# Patient Record
Sex: FEMALE | Race: WHITE | Employment: PART TIME | URBAN - NONMETROPOLITAN AREA
[De-identification: names, ages, dates, MRNs, and addresses within clinical notes are randomized per-mention and may not be internally consistent; named-entity substitution may affect disease eponyms.]

---

## 2022-12-30 ENCOUNTER — HOSPITAL ENCOUNTER (OUTPATIENT)
Dept: NURSING | Age: 27
Discharge: HOME | End: 2022-12-30

## 2023-01-10 ENCOUNTER — HOSPITAL ENCOUNTER (OUTPATIENT)
Dept: NURSING | Age: 28
Discharge: HOME | End: 2023-01-10
Payer: COMMERCIAL

## 2023-01-10 VITALS
HEART RATE: 101 BPM | RESPIRATION RATE: 18 BRPM | SYSTOLIC BLOOD PRESSURE: 136 MMHG | OXYGEN SATURATION: 98 % | DIASTOLIC BLOOD PRESSURE: 81 MMHG

## 2023-01-10 LAB
ANTIBODY SCREEN: NORMAL
GESTATIONAL AGE(WEEKS): NORMAL

## 2023-01-10 PROCEDURE — 6360000002 HC RX W HCPCS: Performed by: OBSTETRICS & GYNECOLOGY

## 2023-01-10 PROCEDURE — 96372 THER/PROPH/DIAG INJ SC/IM: CPT

## 2023-01-10 PROCEDURE — 86850 RBC ANTIBODY SCREEN: CPT

## 2023-01-10 RX ADMIN — HUMAN RHO(D) IMMUNE GLOBULIN 300 MCG: 300 INJECTION, SOLUTION INTRAMUSCULAR at 09:13

## 2023-01-10 NOTE — PROGRESS NOTES
5732 pt arrives ambulatory for rhogam injection. Injection explained and questions answered. PT RIGHTS AND RESPONSIBILITIES OFFERED TO PT.   [id number] labs drawn and sent down as ordered. 0913 injection given. Pt tolerated it well with no complaints. Pt discharged ambulatory with instructions with no complaints.                        _m___ Safety:       (Environmental)  [place] to environment  Ensure ID band is correct and in place/ allergy band as needed  Assess for fall risk  Initiate fall precautions as applicable (fall band, side rails, etc.)  Call light within reach  Bed in low position/ wheels locked    _m___ Pain:       Assess pain level and characteristics  Administer analgesics as ordered  Assess effectiveness of pain management and report to MD as needed    __m__ Knowledge Deficit:  Assess baseline knowledge  Provide teaching at level of understanding  Provide teaching via preferred learning method  Evaluate teaching effectiveness    __m__ Hemodynamic/Respiratory Status:       (Pre and Post Procedure Monitoring)  Assess/Monitor vital signs and LOC  Assess Baseline SpO2 prior to any sedation  Obtain weight/height  Assess vital signs/ LOC until patient meets discharge criteria  Monitor procedure site and notify MD of any issues

## 2023-01-10 NOTE — DISCHARGE INSTRUCTIONS
ACTIVITY:  Continue usual care with your doctor. Call your doctor immediately if any severe problems or go to the nearest emergency room. I have been treated and hereby acknowledge receiving this instruction sheet. PLEASE GIVE WHITE CARD TO NURSE WHEN YOU GO TO DELIVER.

## 2023-03-17 ENCOUNTER — APPOINTMENT (OUTPATIENT)
Dept: LABOR AND DELIVERY | Age: 28
End: 2023-03-17
Payer: COMMERCIAL

## 2023-03-17 ENCOUNTER — APPOINTMENT (OUTPATIENT)
Dept: ULTRASOUND IMAGING | Age: 28
End: 2023-03-17
Payer: COMMERCIAL

## 2023-03-17 ENCOUNTER — HOSPITAL ENCOUNTER (INPATIENT)
Age: 28
LOS: 3 days | Discharge: HOME | End: 2023-03-20
Attending: OBSTETRICS & GYNECOLOGY | Admitting: OBSTETRICS & GYNECOLOGY
Payer: COMMERCIAL

## 2023-03-17 PROBLEM — Z34.90 ENCOUNTER FOR ELECTIVE INDUCTION OF LABOR: Status: ACTIVE | Noted: 2023-03-17

## 2023-03-17 LAB
ABO: NORMAL
AMPHETAMINES UR QL SCN: NEGATIVE
ANTIBODY SCREEN: NORMAL
BARBITURATES UR QL SCN: NEGATIVE
BASOPHILS ABSOLUTE: 0.1 THOU/MM3 (ref 0–0.1)
BASOPHILS NFR BLD AUTO: 0.6 %
BENZODIAZ UR QL SCN: NEGATIVE
BZE UR QL SCN: NEGATIVE
CANNABINOIDS UR QL SCN: NEGATIVE
DEPRECATED RDW RBC AUTO: 49 FL (ref 35–45)
EOSINOPHIL NFR BLD AUTO: 0.6 %
EOSINOPHILS ABSOLUTE: 0.1 THOU/MM3 (ref 0–0.4)
ERYTHROCYTE [DISTWIDTH] IN BLOOD BY AUTOMATED COUNT: 15.4 % (ref 11.5–14.5)
HCT VFR BLD AUTO: 34.5 % (ref 37–47)
HGB BLD-MCNC: 10.9 GM/DL (ref 12–16)
IMM GRANULOCYTES # BLD AUTO: 0.18 THOU/MM3 (ref 0–0.07)
IMM GRANULOCYTES NFR BLD AUTO: 1.7 %
INDIRECT COOMBS: NORMAL
LYMPHOCYTES ABSOLUTE: 2.1 THOU/MM3 (ref 1–4.8)
LYMPHOCYTES NFR BLD AUTO: 19.6 %
MCH RBC QN AUTO: 28 PG (ref 26–33)
MCHC RBC AUTO-ENTMCNC: 31.6 GM/DL (ref 32.2–35.5)
MCV RBC AUTO: 88.7 FL (ref 81–99)
MONOCYTES ABSOLUTE: 0.6 THOU/MM3 (ref 0.4–1.3)
MONOCYTES NFR BLD AUTO: 5.9 %
NEUTROPHILS NFR BLD AUTO: 71.6 %
NRBC BLD AUTO-RTO: 0 /100 WBC
OPIATES UR QL SCN: NEGATIVE
OXYCODONE: NEGATIVE
PHENCYCLIDINE QUANTITATIVE URINE: NEGATIVE
PLATELET # BLD AUTO: 226 THOU/MM3 (ref 130–400)
PMV BLD AUTO: 10.2 FL (ref 9.4–12.4)
RBC # BLD AUTO: 3.89 MILL/MM3 (ref 4.2–5.4)
RH FACTOR: NORMAL
RPR SER QL: NONREACTIVE
SEGMENTED NEUTROPHILS ABSOLUTE COUNT: 7.8 THOU/MM3 (ref 1.8–7.7)
WBC # BLD AUTO: 10.9 THOU/MM3 (ref 4.8–10.8)

## 2023-03-17 PROCEDURE — 86592 SYPHILIS TEST NON-TREP QUAL: CPT

## 2023-03-17 PROCEDURE — 1220000001 HC SEMI PRIVATE L&D R&B

## 2023-03-17 PROCEDURE — 2580000003 HC RX 258: Performed by: OBSTETRICS & GYNECOLOGY

## 2023-03-17 PROCEDURE — 6370000000 HC RX 637 (ALT 250 FOR IP): Performed by: OBSTETRICS & GYNECOLOGY

## 2023-03-17 PROCEDURE — 76815 OB US LIMITED FETUS(S): CPT

## 2023-03-17 PROCEDURE — 86850 RBC ANTIBODY SCREEN: CPT

## 2023-03-17 PROCEDURE — G0480 DRUG TEST DEF 1-7 CLASSES: HCPCS

## 2023-03-17 PROCEDURE — 86900 BLOOD TYPING SEROLOGIC ABO: CPT

## 2023-03-17 PROCEDURE — 80305 DRUG TEST PRSMV DIR OPT OBS: CPT

## 2023-03-17 PROCEDURE — 85025 COMPLETE CBC W/AUTO DIFF WBC: CPT

## 2023-03-17 PROCEDURE — 86901 BLOOD TYPING SEROLOGIC RH(D): CPT

## 2023-03-17 PROCEDURE — 6360000002 HC RX W HCPCS: Performed by: OBSTETRICS & GYNECOLOGY

## 2023-03-17 PROCEDURE — 86885 COOMBS TEST INDIRECT QUAL: CPT

## 2023-03-17 RX ORDER — DIPHENHYDRAMINE HYDROCHLORIDE 50 MG/ML
25 INJECTION INTRAMUSCULAR; INTRAVENOUS EVERY 4 HOURS PRN
Status: DISCONTINUED | OUTPATIENT
Start: 2023-03-17 | End: 2023-03-18 | Stop reason: HOSPADM

## 2023-03-17 RX ORDER — IBUPROFEN 800 MG/1
800 TABLET ORAL EVERY 8 HOURS PRN
Status: DISCONTINUED | OUTPATIENT
Start: 2023-03-17 | End: 2023-03-18 | Stop reason: HOSPADM

## 2023-03-17 RX ORDER — SODIUM CHLORIDE, SODIUM LACTATE, POTASSIUM CHLORIDE, AND CALCIUM CHLORIDE .6; .31; .03; .02 G/100ML; G/100ML; G/100ML; G/100ML
1000 INJECTION, SOLUTION INTRAVENOUS PRN
Status: DISCONTINUED | OUTPATIENT
Start: 2023-03-17 | End: 2023-03-18 | Stop reason: HOSPADM

## 2023-03-17 RX ORDER — DEXTROSE MONOHYDRATE 50 MG/ML
100 INJECTION, SOLUTION INTRAVENOUS PRN
Status: DISCONTINUED | OUTPATIENT
Start: 2023-03-17 | End: 2023-03-18

## 2023-03-17 RX ORDER — SODIUM CHLORIDE 9 MG/ML
INJECTION, SOLUTION INTRAVENOUS PRN
Status: DISCONTINUED | OUTPATIENT
Start: 2023-03-17 | End: 2023-03-18

## 2023-03-17 RX ORDER — INSULIN LISPRO 100 [IU]/ML
0-4 INJECTION, SOLUTION INTRAVENOUS; SUBCUTANEOUS PRN
Status: DISCONTINUED | OUTPATIENT
Start: 2023-03-17 | End: 2023-03-20 | Stop reason: HOSPADM

## 2023-03-17 RX ORDER — MORPHINE SULFATE 4 MG/ML
4 INJECTION, SOLUTION INTRAMUSCULAR; INTRAVENOUS
Status: DISCONTINUED | OUTPATIENT
Start: 2023-03-17 | End: 2023-03-18 | Stop reason: HOSPADM

## 2023-03-17 RX ORDER — ONDANSETRON 2 MG/ML
8 INJECTION INTRAMUSCULAR; INTRAVENOUS EVERY 6 HOURS PRN
Status: DISCONTINUED | OUTPATIENT
Start: 2023-03-17 | End: 2023-03-18 | Stop reason: HOSPADM

## 2023-03-17 RX ORDER — SODIUM CHLORIDE 0.9 % (FLUSH) 0.9 %
5-40 SYRINGE (ML) INJECTION EVERY 12 HOURS SCHEDULED
Status: DISCONTINUED | OUTPATIENT
Start: 2023-03-17 | End: 2023-03-18

## 2023-03-17 RX ORDER — METHYLERGONOVINE MALEATE 0.2 MG/ML
200 INJECTION INTRAVENOUS PRN
Status: DISCONTINUED | OUTPATIENT
Start: 2023-03-17 | End: 2023-03-18 | Stop reason: HOSPADM

## 2023-03-17 RX ORDER — OXYTOCIN/0.9 % SODIUM CHLORIDE 30/500 ML
1 PLASTIC BAG, INJECTION (ML) INTRAVENOUS CONTINUOUS
Status: DISCONTINUED | OUTPATIENT
Start: 2023-03-17 | End: 2023-03-18

## 2023-03-17 RX ORDER — MORPHINE SULFATE 2 MG/ML
2 INJECTION, SOLUTION INTRAMUSCULAR; INTRAVENOUS
Status: DISCONTINUED | OUTPATIENT
Start: 2023-03-17 | End: 2023-03-18 | Stop reason: HOSPADM

## 2023-03-17 RX ORDER — SODIUM CHLORIDE, SODIUM LACTATE, POTASSIUM CHLORIDE, CALCIUM CHLORIDE 600; 310; 30; 20 MG/100ML; MG/100ML; MG/100ML; MG/100ML
INJECTION, SOLUTION INTRAVENOUS CONTINUOUS
Status: DISCONTINUED | OUTPATIENT
Start: 2023-03-17 | End: 2023-03-18

## 2023-03-17 RX ORDER — SODIUM CHLORIDE 0.9 % (FLUSH) 0.9 %
5-40 SYRINGE (ML) INJECTION PRN
Status: DISCONTINUED | OUTPATIENT
Start: 2023-03-17 | End: 2023-03-18

## 2023-03-17 RX ORDER — ACETAMINOPHEN 325 MG/1
650 TABLET ORAL EVERY 4 HOURS PRN
Status: DISCONTINUED | OUTPATIENT
Start: 2023-03-17 | End: 2023-03-18 | Stop reason: HOSPADM

## 2023-03-17 RX ORDER — BUTORPHANOL TARTRATE 1 MG/ML
1 INJECTION, SOLUTION INTRAMUSCULAR; INTRAVENOUS
Status: DISCONTINUED | OUTPATIENT
Start: 2023-03-17 | End: 2023-03-18 | Stop reason: HOSPADM

## 2023-03-17 RX ORDER — SODIUM CHLORIDE, SODIUM LACTATE, POTASSIUM CHLORIDE, AND CALCIUM CHLORIDE .6; .31; .03; .02 G/100ML; G/100ML; G/100ML; G/100ML
500 INJECTION, SOLUTION INTRAVENOUS PRN
Status: DISCONTINUED | OUTPATIENT
Start: 2023-03-17 | End: 2023-03-18 | Stop reason: HOSPADM

## 2023-03-17 RX ORDER — SEVOFLURANE 250 ML/250ML
1 LIQUID RESPIRATORY (INHALATION) CONTINUOUS PRN
Status: DISCONTINUED | OUTPATIENT
Start: 2023-03-17 | End: 2023-03-18 | Stop reason: HOSPADM

## 2023-03-17 RX ORDER — MISOPROSTOL 200 UG/1
1000 TABLET ORAL PRN
Status: DISCONTINUED | OUTPATIENT
Start: 2023-03-17 | End: 2023-03-18 | Stop reason: HOSPADM

## 2023-03-17 RX ORDER — TERBUTALINE SULFATE 1 MG/ML
0.25 INJECTION, SOLUTION SUBCUTANEOUS
Status: DISCONTINUED | OUTPATIENT
Start: 2023-03-17 | End: 2023-03-18

## 2023-03-17 RX ORDER — DEXTROSE AND SODIUM CHLORIDE 5; .45 G/100ML; G/100ML
INJECTION, SOLUTION INTRAVENOUS CONTINUOUS
Status: DISCONTINUED | OUTPATIENT
Start: 2023-03-17 | End: 2023-03-18

## 2023-03-17 RX ORDER — TRANEXAMIC ACID 10 MG/ML
1000 INJECTION, SOLUTION INTRAVENOUS
Status: DISCONTINUED | OUTPATIENT
Start: 2023-03-17 | End: 2023-03-18

## 2023-03-17 RX ORDER — CARBOPROST TROMETHAMINE 250 UG/ML
250 INJECTION, SOLUTION INTRAMUSCULAR PRN
Status: DISCONTINUED | OUTPATIENT
Start: 2023-03-17 | End: 2023-03-18 | Stop reason: HOSPADM

## 2023-03-17 RX ADMIN — SODIUM CHLORIDE, POTASSIUM CHLORIDE, SODIUM LACTATE AND CALCIUM CHLORIDE: 600; 310; 30; 20 INJECTION, SOLUTION INTRAVENOUS at 06:26

## 2023-03-17 RX ADMIN — Medication 1 MILLI-UNITS/MIN: at 21:23

## 2023-03-17 RX ADMIN — SODIUM CHLORIDE, POTASSIUM CHLORIDE, SODIUM LACTATE AND CALCIUM CHLORIDE: 600; 310; 30; 20 INJECTION, SOLUTION INTRAVENOUS at 20:17

## 2023-03-17 RX ADMIN — Medication 25 MCG: at 11:01

## 2023-03-17 RX ADMIN — SODIUM CHLORIDE, POTASSIUM CHLORIDE, SODIUM LACTATE AND CALCIUM CHLORIDE: 600; 310; 30; 20 INJECTION, SOLUTION INTRAVENOUS at 12:51

## 2023-03-17 RX ADMIN — Medication 25 MCG: at 07:46

## 2023-03-17 RX ADMIN — ONDANSETRON 8 MG: 2 INJECTION INTRAMUSCULAR; INTRAVENOUS at 23:21

## 2023-03-17 NOTE — FLOWSHEET NOTE
Dr. Elisabet Craig called unit. Notified that ultrasound technician Luz Patient stated fetal presentation is vertex.

## 2023-03-17 NOTE — FLOWSHEET NOTE
Dr. Camilo Roman returned page. Would like to proceed with a Cytotec induction and check blood sugar q3 hours. No other questions or concerns at this time.

## 2023-03-17 NOTE — FLOWSHEET NOTE
Dr Jeanene Klinefelter sent message to verify that IV fluids of D5 45 was to be started or only if pts chemstick drops below 100.  MD replies only start if chemstick gets below 100

## 2023-03-17 NOTE — FLOWSHEET NOTE
Dr Jeanene Klinefelter in department updated that cytotec was placed, VE closed. Pts chemstick this am per pts device was 122.  No change in POC

## 2023-03-17 NOTE — FLOWSHEET NOTE
Dr. Oseas Montana on the phone for update. Updated that RN got busy with another pt, will take her to the BR return to bed and check her cervix.  Rn to text for a call back when she is finished

## 2023-03-17 NOTE — FLOWSHEET NOTE
Dr. Levi Gonzalez text messaged to call for update. MD returns call. Updated on VE per Rn. Cervix off to pt left side. Nuby cervix, outer os starting to dimple but RN could not get finger in. RN is able to feel a PP through lower uterine segment. Reactive FHT's. RN reviewing 1500 BS-129. Orders clarified. MD update on cytotec order readingcan repeat dose unless she is having contractions every 5 mins, 40-60 secs long, mod/strong palpation with no cervical change. The order does state to notify MD if pt is having regular contractions. RN notes periods where the pt has regular contractions and then they space out. MD ok for another cytotec dose since pt is barely noticing contractions pain.  Resume POC

## 2023-03-17 NOTE — FLOWSHEET NOTE
Patient presents to the unit for an elective induction.  39+1. Patient is being induced due to being a type 1 diabetic. Patient denies pain, leakage of blood or fluid. Patient states she is feeling baby move. Patient to bathroom to void, informed of maternal drug testing policy in place on all laboring patients. Verbal consent received, paper consent to be signed and urine to be sent. No further questions or concerns at this time. Call light within reach.  at bedside.

## 2023-03-17 NOTE — FLOWSHEET NOTE
Dr. Garcia text messaged to call for update. Dr. Garcia returns call. Updated on SROM at 1656 clear fluid. Cervix still closed/60/ballots but RN notes cervix more mid position, easier to reach. RN can still feel presenting part through lower uterine segment but cannot determine what it is. RN did not place the 3rd dose of cytotec because RN began to note contractions 2-4 mins apart. Pt called out with SROM. Continue to observe. Reactive FHT's

## 2023-03-18 ENCOUNTER — ANESTHESIA EVENT (OUTPATIENT)
Dept: LABOR AND DELIVERY | Age: 28
End: 2023-03-18
Payer: COMMERCIAL

## 2023-03-18 ENCOUNTER — ANESTHESIA (OUTPATIENT)
Dept: LABOR AND DELIVERY | Age: 28
End: 2023-03-18
Payer: COMMERCIAL

## 2023-03-18 PROCEDURE — 2500000003 HC RX 250 WO HCPCS: Performed by: NURSE ANESTHETIST, CERTIFIED REGISTERED

## 2023-03-18 PROCEDURE — 6360000002 HC RX W HCPCS: Performed by: OBSTETRICS & GYNECOLOGY

## 2023-03-18 PROCEDURE — 6370000000 HC RX 637 (ALT 250 FOR IP): Performed by: OBSTETRICS & GYNECOLOGY

## 2023-03-18 PROCEDURE — 1220000000 HC SEMI PRIVATE OB R&B

## 2023-03-18 PROCEDURE — 7200000001 HC VAGINAL DELIVERY

## 2023-03-18 PROCEDURE — 2500000003 HC RX 250 WO HCPCS: Performed by: OBSTETRICS & GYNECOLOGY

## 2023-03-18 PROCEDURE — 3E0P7VZ INTRODUCTION OF HORMONE INTO FEMALE REPRODUCTIVE, VIA NATURAL OR ARTIFICIAL OPENING: ICD-10-PCS | Performed by: OBSTETRICS & GYNECOLOGY

## 2023-03-18 PROCEDURE — 2580000003 HC RX 258: Performed by: OBSTETRICS & GYNECOLOGY

## 2023-03-18 PROCEDURE — 3700000025 EPIDURAL BLOCK: Performed by: ANESTHESIOLOGY

## 2023-03-18 PROCEDURE — A4216 STERILE WATER/SALINE, 10 ML: HCPCS | Performed by: OBSTETRICS & GYNECOLOGY

## 2023-03-18 PROCEDURE — 2500000003 HC RX 250 WO HCPCS

## 2023-03-18 PROCEDURE — 0KQM0ZZ REPAIR PERINEUM MUSCLE, OPEN APPROACH: ICD-10-PCS | Performed by: OBSTETRICS & GYNECOLOGY

## 2023-03-18 RX ORDER — ONDANSETRON 4 MG/1
8 TABLET, ORALLY DISINTEGRATING ORAL EVERY 8 HOURS PRN
Status: DISCONTINUED | OUTPATIENT
Start: 2023-03-18 | End: 2023-03-20 | Stop reason: HOSPADM

## 2023-03-18 RX ORDER — ACETAMINOPHEN 500 MG
1000 TABLET ORAL EVERY 8 HOURS SCHEDULED
Status: DISCONTINUED | OUTPATIENT
Start: 2023-03-18 | End: 2023-03-20 | Stop reason: HOSPADM

## 2023-03-18 RX ORDER — SODIUM CHLORIDE 9 MG/ML
INJECTION, SOLUTION INTRAVENOUS PRN
Status: DISCONTINUED | OUTPATIENT
Start: 2023-03-18 | End: 2023-03-20 | Stop reason: HOSPADM

## 2023-03-18 RX ORDER — NALOXONE HYDROCHLORIDE 0.4 MG/ML
INJECTION, SOLUTION INTRAMUSCULAR; INTRAVENOUS; SUBCUTANEOUS PRN
Status: DISCONTINUED | OUTPATIENT
Start: 2023-03-18 | End: 2023-03-18 | Stop reason: HOSPADM

## 2023-03-18 RX ORDER — ONDANSETRON 2 MG/ML
4 INJECTION INTRAMUSCULAR; INTRAVENOUS EVERY 6 HOURS PRN
Status: DISCONTINUED | OUTPATIENT
Start: 2023-03-18 | End: 2023-03-18 | Stop reason: HOSPADM

## 2023-03-18 RX ORDER — MISOPROSTOL 200 UG/1
800 TABLET ORAL PRN
Status: DISCONTINUED | OUTPATIENT
Start: 2023-03-18 | End: 2023-03-20 | Stop reason: HOSPADM

## 2023-03-18 RX ORDER — DOCUSATE SODIUM 100 MG/1
100 CAPSULE, LIQUID FILLED ORAL 2 TIMES DAILY
Status: DISCONTINUED | OUTPATIENT
Start: 2023-03-18 | End: 2023-03-20 | Stop reason: HOSPADM

## 2023-03-18 RX ORDER — OXYTOCIN/0.9 % SODIUM CHLORIDE 30/500 ML
87.3 PLASTIC BAG, INJECTION (ML) INTRAVENOUS PRN
Status: DISCONTINUED | OUTPATIENT
Start: 2023-03-18 | End: 2023-03-18

## 2023-03-18 RX ORDER — SODIUM CHLORIDE 0.9 % (FLUSH) 0.9 %
5-40 SYRINGE (ML) INJECTION PRN
Status: DISCONTINUED | OUTPATIENT
Start: 2023-03-18 | End: 2023-03-20 | Stop reason: HOSPADM

## 2023-03-18 RX ORDER — IBUPROFEN 600 MG/1
600 TABLET ORAL EVERY 8 HOURS SCHEDULED
Status: DISCONTINUED | OUTPATIENT
Start: 2023-03-18 | End: 2023-03-20 | Stop reason: HOSPADM

## 2023-03-18 RX ORDER — LIDOCAINE HYDROCHLORIDE 10 MG/ML
INJECTION, SOLUTION INFILTRATION; PERINEURAL PRN
Status: DISCONTINUED | OUTPATIENT
Start: 2023-03-18 | End: 2023-03-18 | Stop reason: SDUPTHER

## 2023-03-18 RX ORDER — MODIFIED LANOLIN
OINTMENT (GRAM) TOPICAL PRN
Status: DISCONTINUED | OUTPATIENT
Start: 2023-03-18 | End: 2023-03-20 | Stop reason: HOSPADM

## 2023-03-18 RX ORDER — LIDOCAINE HYDROCHLORIDE AND EPINEPHRINE 20; 5 MG/ML; UG/ML
INJECTION, SOLUTION EPIDURAL; INFILTRATION; INTRACAUDAL; PERINEURAL PRN
Status: DISCONTINUED | OUTPATIENT
Start: 2023-03-18 | End: 2023-03-18 | Stop reason: SDUPTHER

## 2023-03-18 RX ORDER — METHYLERGONOVINE MALEATE 0.2 MG/ML
200 INJECTION INTRAVENOUS PRN
Status: DISCONTINUED | OUTPATIENT
Start: 2023-03-18 | End: 2023-03-20 | Stop reason: HOSPADM

## 2023-03-18 RX ORDER — SODIUM CHLORIDE, SODIUM LACTATE, POTASSIUM CHLORIDE, CALCIUM CHLORIDE 600; 310; 30; 20 MG/100ML; MG/100ML; MG/100ML; MG/100ML
INJECTION, SOLUTION INTRAVENOUS CONTINUOUS
Status: DISCONTINUED | OUTPATIENT
Start: 2023-03-18 | End: 2023-03-20 | Stop reason: HOSPADM

## 2023-03-18 RX ORDER — SODIUM CHLORIDE 0.9 % (FLUSH) 0.9 %
5-40 SYRINGE (ML) INJECTION EVERY 12 HOURS SCHEDULED
Status: DISCONTINUED | OUTPATIENT
Start: 2023-03-18 | End: 2023-03-20 | Stop reason: HOSPADM

## 2023-03-18 RX ADMIN — Medication 18 ML/HR: at 03:41

## 2023-03-18 RX ADMIN — SODIUM CHLORIDE, POTASSIUM CHLORIDE, SODIUM LACTATE AND CALCIUM CHLORIDE: 600; 310; 30; 20 INJECTION, SOLUTION INTRAVENOUS at 00:39

## 2023-03-18 RX ADMIN — DOCUSATE SODIUM 100 MG: 100 CAPSULE, LIQUID FILLED ORAL at 20:39

## 2023-03-18 RX ADMIN — IBUPROFEN 600 MG: 600 TABLET, FILM COATED ORAL at 20:39

## 2023-03-18 RX ADMIN — SODIUM CHLORIDE, POTASSIUM CHLORIDE, SODIUM LACTATE AND CALCIUM CHLORIDE: 600; 310; 30; 20 INJECTION, SOLUTION INTRAVENOUS at 06:59

## 2023-03-18 RX ADMIN — FAMOTIDINE 40 MG: 10 INJECTION, SOLUTION INTRAVENOUS at 06:53

## 2023-03-18 RX ADMIN — LIDOCAINE HYDROCHLORIDE AND EPINEPHRINE 2 ML: 20; 5 INJECTION, SOLUTION EPIDURAL; INFILTRATION; INTRACAUDAL; PERINEURAL at 03:39

## 2023-03-18 RX ADMIN — BUTORPHANOL TARTRATE 1 MG: 1 INJECTION, SOLUTION INTRAMUSCULAR; INTRAVENOUS at 00:40

## 2023-03-18 RX ADMIN — Medication 10 ML: at 03:40

## 2023-03-18 RX ADMIN — LIDOCAINE HYDROCHLORIDE AND EPINEPHRINE 3 ML: 20; 5 INJECTION, SOLUTION EPIDURAL; INFILTRATION; INTRACAUDAL; PERINEURAL at 03:35

## 2023-03-18 RX ADMIN — Medication 166.7 ML: at 08:43

## 2023-03-18 RX ADMIN — ACETAMINOPHEN 1000 MG: 500 TABLET ORAL at 13:36

## 2023-03-18 RX ADMIN — IBUPROFEN 800 MG: 800 TABLET, FILM COATED ORAL at 10:49

## 2023-03-18 RX ADMIN — BENZOCAINE AND LEVOMENTHOL: 200; 5 SPRAY TOPICAL at 08:54

## 2023-03-18 RX ADMIN — LIDOCAINE HYDROCHLORIDE 3 ML: 10 INJECTION, SOLUTION INFILTRATION; PERINEURAL at 03:28

## 2023-03-18 ASSESSMENT — PAIN DESCRIPTION - LOCATION
LOCATION: PERINEUM
LOCATION: PERINEUM
LOCATION: ABDOMEN

## 2023-03-18 ASSESSMENT — PAIN - FUNCTIONAL ASSESSMENT
PAIN_FUNCTIONAL_ASSESSMENT: ACTIVITIES ARE NOT PREVENTED
PAIN_FUNCTIONAL_ASSESSMENT: ACTIVITIES ARE NOT PREVENTED

## 2023-03-18 ASSESSMENT — PAIN DESCRIPTION - DESCRIPTORS
DESCRIPTORS: CRAMPING
DESCRIPTORS: DISCOMFORT

## 2023-03-18 ASSESSMENT — PAIN DESCRIPTION - FREQUENCY: FREQUENCY: CONTINUOUS

## 2023-03-18 ASSESSMENT — PAIN SCALES - GENERAL
PAINLEVEL_OUTOF10: 5
PAINLEVEL_OUTOF10: 2
PAINLEVEL_OUTOF10: 4
PAINLEVEL_OUTOF10: 2
PAINLEVEL_OUTOF10: 2

## 2023-03-18 ASSESSMENT — PAIN DESCRIPTION - PAIN TYPE: TYPE: ACUTE PAIN

## 2023-03-18 ASSESSMENT — PAIN DESCRIPTION - ORIENTATION
ORIENTATION: LOWER;MID
ORIENTATION: LOWER

## 2023-03-18 ASSESSMENT — PAIN DESCRIPTION - ONSET: ONSET: ON-GOING

## 2023-03-18 NOTE — ANESTHESIA PROCEDURE NOTES
Epidural Block    Patient location during procedure: OB  Start time: 3/18/2023 3:25 AM  End time: 3/18/2023 3:35 AM  Reason for block: labor epidural  Staffing  Performed: resident/CRNA   Anesthesiologist: Av Wilson DO  Resident/CRNA: Theresa Wren  Epidural  Patient position: sitting  Prep: ChloraPrep  Patient monitoring: continuous pulse ox and frequent blood pressure checks  Approach: midline  Location: L2-3  Injection technique: ALETA saline  Guidance: paresthesia technique  Provider prep: mask and sterile gloves  Needle  Needle type: Tuohy   Needle gauge: 18 G  Needle length: 3.5 in  Needle insertion depth: 6 cm  Catheter type: side hole  Catheter size: 20 G  Catheter at skin depth: 12 cm  Test dose: negative  Kit: [id number]  Lot number: [id number]  Expiration date: 9/30/2023Catheter Secured: tegaderm and tape  Assessment  Hemodynamics: stable  Attempts: 1  Outcomes: patient tolerated procedure well and uncomplicated  Preanesthetic Checklist  Completed: patient identified, IV checked, site marked, risks and benefits discussed, surgical/procedural consents, equipment checked, pre-op evaluation, timeout performed, anesthesia consent given, oxygen available, monitors applied/VS acknowledged, fire risk safety assessment completed and verbalized and blood product R/B/A discussed and consented

## 2023-03-18 NOTE — FLOWSHEET NOTE
Up to the bathroom attempted to void. Pericare per pt. Dermaplast and tucks used, ice pack and clean pad put in place. Gown changed. Pt to wheelchair and taken to 5A14 in stable condition with  son in arms.  Report to Jag Grove RN @ 5512

## 2023-03-18 NOTE — H&P
Obstetric Admission Note        CHIEF COMPLAINT: for delivery    HISTORY OF PRESENT ILLNESS:                     The patient is a 28 y.o.  female @ 39+2 weeks with significant past medical history of Type 1 DM who presented for induction. 2 doses of cytotec placed. SROM clear fluid. Comfortable with epidural.  Pregnancy also complicated by maternal obesity. Good FM. Past Medical History:        Diagnosis Date    Diabetes mellitus ([facility] [address]     Headache     UTI (urinary tract infection)        Medications Prior to Admission:   Medications Prior to Admission: Rizatriptan Benzoate (MAXALT PO), Take by mouth  ketorolac (TORADOL) 10 MG tablet, Take 1 tablet by mouth every 6 hours as needed for Pain  ondansetron (ZOFRAN ODT) 4 MG disintegrating tablet, Take 1 tablet by mouth every 8 hours as needed for Nausea or Vomiting  Norethindrone Acet-Ethinyl Est (MICROGESTIN) 1.5-30 MG-MCG TABS, Take 1 tablet by mouth daily    Allergies:  Sulfa antibiotics and Vicodin [hydrocodone-acetaminophen]     DATA:    Cervical exam complete/+1   Membranes ruptured at 1656 3/17  T's cat 2 with variables    ASSESSMENT AND PLAN:      Assessment problems  IUP @ 39+2wks  Induction  Type 1 DM  Maternal obesity  Rh neg    Plan:  - Admit to L&D  - Anticipate Vaginal Delivery  Peter DUNCAN

## 2023-03-18 NOTE — FLOWSHEET NOTE
Dr Wade Williamson in department updated on BP's of 187/94, 175/71, 212/130, 167/78.  No change in POC

## 2023-03-18 NOTE — FLOWSHEET NOTE
Pt report her blood sugar via dexcom prior to lunch is 115. No needs voiced at time time. Family at bedside.

## 2023-03-18 NOTE — FLOWSHEET NOTE
Updated Dr. Caridad Eaton of [facility], position change and variables have resolved. Hold amnioinfusion.

## 2023-03-18 NOTE — FLOWSHEET NOTE
Updated Dr. Delores Carmichael regarding SVE, FHTs with early decelerations, contraction pattern. DO states may start pitocin.

## 2023-03-18 NOTE — L&D DELIVERY NOTE
Department of Obstetrics and Gynecology  Spontaneous Vaginal Delivery Note      Pt Name: Steven Alba  MRN: [id number] Acct #: [de-identified]  YOB: 1995  Procedure Performed By: Nel Ng DO, D.O.        Pre-operative Diagnosis:  Term pregnancy, Induced labor, Single fetus, and Pregnancy complicated by: Type 1 DM on pump , maternal obesity    Post-operative Diagnosis: Same, delivered. Procedure:  Spontaneous vaginal delivery or Repair second degree spontaneous laceration    Surgeon:  Ric Dowell DO, D.O. Information for the patient's :  [facility] [[id number]]        Anesthesia:  epidural anesthesia    Estimated blood loss:  400ml    Specimen:  Placenta not sent to pathology     Complications:  none    Condition:  infant stable to general nursery and mother stable    Details of Procedure: The patient is a 28 y.o. female at 44w2d   OB History          2    Para   0    Term   0       0    AB   1    Living   0         SAB   1    IAB   0    Ectopic   0    Molar        Multiple   0    Live Births                 who was admitted for induction. She received the following interventions: vaginal Cytotec and IV Pitocin augmentation. The patient progressed well,did receive an epidural, became complete and started to push. Patient progressed well and the fetal head was delivered over an intact perineum, no nuchal cord was noted, and the rest of the infant delivered atraumatically. Infant was placed on mother abdomen. Nose and mouth suctioned with bulb suction. Cord was clamped and cut. The delivery of the placenta was spontaneous and noted intact. The perineum and vagina were explored and a second degree laceration was repaired in standard fashion with 3-0 vicryl. Sponge, instruments, and needle counts were correct. Needles were disposed of appropriately.       Delivery Summary:  Labor & Delivery Summary  Dilation Complete Date: 23  Dilation

## 2023-03-18 NOTE — FLOWSHEET NOTE
Pt states blood sugar via dexcom is 126, and pt bolused herself Novolog 6 units to cover meal for dinner.

## 2023-03-18 NOTE — FLOWSHEET NOTE
Assisted to the bathroom for the second time. Voided large amount. Joselin care done. Tucks, dermoplast spray and ice applied to perineum. Fundus firm one fingersbreath below the umbilicus. No needs voiced.  Pt going to rest

## 2023-03-18 NOTE — LACTATION NOTE
Provided and discussed breastfeeding booklet with pt. Pt. Stated she has a breast pump for home use. Pt. Stated she has no questions for lactation at this time.

## 2023-03-18 NOTE — ANESTHESIA PRE PROCEDURE
Pulmonary:Negative Pulmonary ROS and normal exam                               Cardiovascular:Negative CV ROS  Exercise tolerance: good (>4 METS),                     Neuro/Psych:   Negative Neuro/Psych ROS              GI/Hepatic/Renal: Neg GI/Hepatic/Renal ROS            Endo/Other:    (+) DiabetesType I DM, well controlled, using insulin, . Abdominal:   (+) obese,            PE comment: pregnant   Vascular: negative vascular ROS. Other Findings:           Anesthesia Plan      epidural     ASA 2             Anesthetic plan and risks discussed with patient. Plan discussed with CRNA.     Attending anesthesiologist reviewed and agrees with Preprocedure content                SEVEN DAS   3/18/2023

## 2023-03-18 NOTE — FLOWSHEET NOTE
Assisted to the bathroom for the first time voided large amount. Small amount of vaginal bleeding noted on calli pad. Calli care instructed and done by pt. Ice pack applied, tucks and dermoplast spray used. Back to bed, fundus firm one fingersbreath below the umbilicus after void. Discussed to call for help to bathroom one more time.

## 2023-03-18 NOTE — FLOWSHEET NOTE
Pt transferred over to mother baby in to room 5A14. Oriented room and plan of care discussed. Infant POC glucose result in labor and delivery prior to transfer was 41. Infant to feed. Mother wants to breastfeed. Iris Hilliard RN assisted mother and infant latched on right breast at [phone]. Pt denies any needs. IV infusing without any difficulties noted. Pt is Type 1 Diabetic has own insulin pump. Dr Oseas Montana ordered for pt to monitor own blood sugars 4 times daily prior to meals and at bedtime. Pt voiced understanding.

## 2023-03-18 NOTE — FLOWSHEET NOTE
Notified Dr. Klaudia Flores of E, patient complains of heartburn, FHTs with recurrent variables. Orders received.

## 2023-03-18 NOTE — FLOWSHEET NOTE
Into pt room pt reports her blood sugar via dexcom was 206. Stated that pt dosed herself 7 units of Novolog for blood sugar of 234 at 1600.

## 2023-03-19 PROCEDURE — 1220000000 HC SEMI PRIVATE OB R&B

## 2023-03-19 PROCEDURE — 6370000000 HC RX 637 (ALT 250 FOR IP): Performed by: OBSTETRICS & GYNECOLOGY

## 2023-03-19 RX ADMIN — IBUPROFEN 600 MG: 600 TABLET, FILM COATED ORAL at 17:49

## 2023-03-19 RX ADMIN — IBUPROFEN 600 MG: 600 TABLET, FILM COATED ORAL at 08:08

## 2023-03-19 RX ADMIN — DOCUSATE SODIUM 100 MG: 100 CAPSULE, LIQUID FILLED ORAL at 19:47

## 2023-03-19 RX ADMIN — DOCUSATE SODIUM 100 MG: 100 CAPSULE, LIQUID FILLED ORAL at 08:08

## 2023-03-19 ASSESSMENT — PAIN DESCRIPTION - DESCRIPTORS
DESCRIPTORS: OTHER (COMMENT);TIGHTNESS
DESCRIPTORS: DISCOMFORT

## 2023-03-19 ASSESSMENT — PAIN SCALES - GENERAL
PAINLEVEL_OUTOF10: 1
PAINLEVEL_OUTOF10: 1
PAINLEVEL_OUTOF10: 3

## 2023-03-19 ASSESSMENT — PAIN DESCRIPTION - PAIN TYPE: TYPE: ACUTE PAIN

## 2023-03-19 ASSESSMENT — PAIN DESCRIPTION - ORIENTATION
ORIENTATION: RIGHT
ORIENTATION: LOWER

## 2023-03-19 ASSESSMENT — PAIN DESCRIPTION - LOCATION
LOCATION: HIP
LOCATION: PERINEUM

## 2023-03-19 NOTE — DISCHARGE INSTRUCTIONS
After Your Delivery Discharge Instructions    After Discharge Orders:  No future appointments. [unfilled]        After your delivery - signs and symptoms to watch for:  Fever - Oral temperature greater than 101.4 degrees Fahrenheit  Foul-smelling vaginal discharge  Headache unrelieved by \"pain meds\"  Difficulty urinating  Breasts reddened, hard, hot to the touch  Nipple discharge which is foul-smelling or contains pus  Increased pain at the site of the surgical incision  Difficulty breathing with or without chest pain  New calf pain especially if only on one side  Sudden, continuing increased vaginal bleeding with or without clots  Unrelieved feelings of: Inability to cope  Sadness  Anxiety  Lack of interest in baby  Insomnia  Crying     What to do at home:  See patient education handouts for full information  Resume activity gradually   Don't lift anything heavier than baby and carrier until OK'd by your Physician  No sex until OK'd by your Physician  Take care of yourself by sleeping/resting as much as possible  Eat regular nutritious meals  Let someone else care for you, your baby, and housework as much as possible   Take pain medication as prescribed whenever you need them  Wear compression stockings if prescribed   To avoid/relieve constipation take stool softeners if advised   Drink lots of water/fruit juices  Increase fiber in your diet  Breast care: Wear support bra ; use lanolin ointment/cream as needed     Refer to  Discharge Instructions for problems or follow-up regarding  Feeding  Return to Office in 4-5 weeks for PP visit.   Discharge instructions reveiwed    Ruthie Galan, DO

## 2023-03-19 NOTE — ANESTHESIA POSTPROCEDURE EVALUATION
Department of Anesthesiology  Postprocedure Note    Patient: Bill Brandon  MRN: [id number]  YOB: 1995  Date of evaluation: 3/19/2023      Procedure Summary     Date: 03/18/23 Room / Location:     Anesthesia Start: 0318 Anesthesia Stop: Darrell Shafer    Procedure: Labor Analgesia Diagnosis:     Scheduled Providers:  Responsible Provider: Fredy Jha DO    Anesthesia Type: epidural ASA Status: 2          Anesthesia Type: No value filed.     Katie Phase I: Katie Score: 9    Katie Phase II: Katie Score: 10      Anesthesia Post Evaluation    Patient location during evaluation: floor  Patient participation: complete - patient participated  Level of consciousness: awake and alert  Airway patency: patent  Nausea & Vomiting: no nausea and no vomiting  Complications: no  Cardiovascular status: hemodynamically stable  Respiratory status: acceptable and room air  Hydration status: euvolemic

## 2023-03-19 NOTE — FLOWSHEET NOTE
Pt reports blood sugar via Dexcom to be 116 before lunch. Pt administered Novolog 8.9 units via own insulin pump to cover lunch.

## 2023-03-19 NOTE — PROGRESS NOTES
Department of Obstetrics and Gynecology  Labor and Delivery  Post Partum Day #1      SUBJECTIVE:  Patient feeling well with no complaints. Breastfeeding bottlefeeding without difficulty. Mild lochia. Patient denies pain. Urination without difficulty. On her pre-pregnancy dosing for her pump and BS controlled. OBJECTIVE:/75   Pulse 73   Temp 97.5 °F (36.4 °C) (Oral)   Resp 16   Ht 5' 6\" (1.676 m)   Wt 280 lb (127 kg)   SpO2 100%   Breastfeeding Unknown   BMI 45.19 kg/m²    ABDOMEN:  Soft, non-tender, fundus firm. Extremities: warm and dry. 1+ edema    DATA:    Lab Results   Component Value Date/Time    HGB 10.9 2023 07:01 AM    HCT 34.5 2023 07:01 AM       ASSESSMENT & PLAN:    PPD #1 s/p .         Routine PP care       D/C home tomorrow       F/U 4 weeks       Ruthie Fail, DO

## 2023-03-19 NOTE — FLOWSHEET NOTE
Pt reports her blood sugar via dexcom of 115. Pt administered herself Novolog 5.575 units via pt insulin pump to cover dinner.

## 2023-03-19 NOTE — FLOWSHEET NOTE
Pt reports her blood sugar via dexcom is 115.  Novolog 7.2 units given per patient insulin pump to cover breakfast.

## 2023-03-19 NOTE — FLOWSHEET NOTE
Pt called out and stated that her right hip is stiff, hard for her to put pressure on foot to walk. Pt states needs to go to bathroom. Voiced this has happened before during the pregnancy. Assisted to bathroom with the braulio steady along with [facility]. Pt able to void. Joselin care done and assisted back to bed, positioned for comfort. Heating pad placed on right hip. Medicated for comfort. No further needs voiced.

## 2023-03-19 NOTE — DISCHARGE SUMMARY
Obstetric Discharge Summary      Pt Name: Humberto Delgado  MRN: [id number] Acct #: [de-identified]  YOB: 1995        Admitting Diagnosis  IUP, Type 1 DM  OB History          2    Para   1    Term   1       0    AB   1    Living   1         SAB   1    IAB   0    Ectopic   0    Molar        Multiple   0    Live Births   1                Reasons for Admission on 3/17/2023  5:55 AM  Encounter for elective induction of labor [Z34.90]  No comment available  Vaginal Delivery      Intrapartum Procedures                          Postpartum/Operative Complications       [place] [employer]  Information for the patient's :  Francisco J Connors, Baby Boy Sanam Herrera [[id number]]   male   Birth Weight: 6 lb 15.5 oz (3.16 kg)     Discharge Diagnosis       Discharge Information  Current Discharge Medication List        STOP taking these medications       Rizatriptan Benzoate (MAXALT PO) Comments:   Reason for Stopping:         ketorolac (TORADOL) 10 MG tablet Comments:   Reason for Stopping:         ondansetron (ZOFRAN ODT) 4 MG disintegrating tablet Comments:   Reason for Stopping:         Norethindrone Acet-Ethinyl Est (MICROGESTIN) 1.5-30 MG-MCG TABS Comments:   Reason for Stopping:               No discharge procedures on file.     Vaginal Delivery  Diet regular  Discharge Date: 3/20/23  Discharge Disposition: Stable    Discharge to:  home  Follow up in 4 weeks  Patti Currie DO.

## 2023-03-20 VITALS
DIASTOLIC BLOOD PRESSURE: 65 MMHG | HEIGHT: 66 IN | SYSTOLIC BLOOD PRESSURE: 118 MMHG | OXYGEN SATURATION: 96 % | TEMPERATURE: 98.3 F | BODY MASS INDEX: 45 KG/M2 | HEART RATE: 79 BPM | RESPIRATION RATE: 17 BRPM | WEIGHT: 280 LBS

## 2023-03-20 PROCEDURE — 6370000000 HC RX 637 (ALT 250 FOR IP): Performed by: OBSTETRICS & GYNECOLOGY

## 2023-03-20 RX ADMIN — ACETAMINOPHEN 1000 MG: 500 TABLET ORAL at 02:41

## 2023-03-20 RX ADMIN — DOCUSATE SODIUM 100 MG: 100 CAPSULE, LIQUID FILLED ORAL at 08:27

## 2023-03-20 ASSESSMENT — PAIN SCALES - GENERAL
PAINLEVEL_OUTOF10: 1
PAINLEVEL_OUTOF10: 2

## 2023-03-20 ASSESSMENT — PAIN - FUNCTIONAL ASSESSMENT: PAIN_FUNCTIONAL_ASSESSMENT: ACTIVITIES ARE NOT PREVENTED

## 2023-03-20 ASSESSMENT — PAIN DESCRIPTION - ORIENTATION: ORIENTATION: LOWER

## 2023-03-20 ASSESSMENT — PAIN DESCRIPTION - DESCRIPTORS: DESCRIPTORS: SORE

## 2023-03-20 ASSESSMENT — PAIN DESCRIPTION - LOCATION: LOCATION: PERINEUM

## 2023-03-20 NOTE — FLOWSHEET NOTE
Post birth warning signs education paper given and reviewed, teaching complete. Marcus postpartum depression screening discussed with patient, instructed to contact her healthcare provider if her score is > 10. Current score 2. Patient voiced understanding. Mother's blood type is B-. .  Baby's blood type is A-. Mohinder Gonzáles

## 2023-03-20 NOTE — PLAN OF CARE
Problem: Chronic Conditions and Co-morbidities  Goal: Patient's chronic conditions and co-morbidity symptoms are monitored and maintained or improved  Outcome: [address] [place] - Patient's Chronic Conditions and Co-Morbidity Symptoms are Monitored and Maintained or Improved:   Monitor and assess patient's chronic conditions and comorbid symptoms for stability, deterioration, or improvement   Collaborate with multidisciplinary team to address chronic and comorbid conditions and prevent exacerbation or deterioration   Update acute care plan with appropriate goals if chronic or comorbid symptoms are exacerbated and prevent overall improvement and discharge     Problem: Safety - Adult  Goal: Free from fall injury  Outcome: Progressing  Flowsheets  Taken 3/18/2023 2039 by Nisreen Asher RN  Free From Fall Injury: Instruct family/caregiver on patient safety     Problem: Infection - Adult  Goal: Absence of infection during hospitalization  Outcome: Progressing  Flowsheets (Taken 3/18/2023 2139)  Absence of infection during hospitalization:   Assess and monitor for signs and symptoms of infection   Monitor lab/diagnostic results     Problem: Infection - Adult  Goal: Absence of infection at discharge  Outcome: Progressing  Flowsheets  Taken 3/18/2023 2039 by Nisreen Asher RN  Absence of infection at discharge:   Assess and monitor for signs and symptoms of infection   Monitor lab/diagnostic results    Problem: Pain  Goal: Verbalizes/displays adequate comfort level or baseline comfort level  Outcome: Progressing  Flowsheets  Taken 3/18/2023 2039 by Nisreen Asher RN  Verbalizes/displays adequate comfort level or baseline comfort level:   Encourage patient to monitor pain and request assistance   Assess pain using appropriate pain scale     Problem: Postpartum  Goal: Incisions, wounds, or drain sites healing without S/S of infection  Outcome: Progressing  Flowsheets (Taken 3/18/2023 2039)  Incisions, Wounds, or Drain
Problem: Vaginal Birth or  Section  Goal: Fetal and maternal status remain reassuring during the birth process  Description:  Birth OB-Pregnancy care plan goal which identifies if the fetal and maternal status remain reassuring during the birth process  3/17/2023 1521 by Tonny Arboleda RN  Outcome: Progressing  Flowsheets (Taken 3/17/2023 1521)  Fetal and Maternal Status Remain Reassuring During the Birth Process:   Monitor vital signs   Monitor fetal heart rate   Monitor uterine activity   Monitor labor progression (Vaginal delivery)     Problem: Pain  Goal: Verbalizes/displays adequate comfort level or baseline comfort level  3/17/2023 1521 by Tonny Arboleda RN  Outcome: Progressing  Flowsheets (Taken 3/17/2023 1521)  Verbalizes/displays adequate comfort level or baseline comfort level:   Encourage patient to monitor pain and request assistance   Assess pain using appropriate pain scale   Administer analgesics based on type and severity of pain and evaluate response     Problem: Infection - Adult  Goal: Absence of infection at discharge  3/17/2023 1521 by Tonny Arboleda RN  Outcome: Progressing  Flowsheets (Taken 3/17/2023 1521)  Absence of infection at discharge:   Assess and monitor for signs and symptoms of infection   Monitor lab/diagnostic results   Monitor all insertion sites i.e., indwelling lines, tubes and drains  Note: Afebrile     Problem: Safety - Adult  Goal: Free from fall injury  3/17/2023 1521 by Tonny Arboleda RN  Outcome: Progressing  Note: Call light within reach     Problem: Discharge Planning  Goal: Discharge to home or other facility with appropriate resources  3/17/2023 1521 by Tonny Arboleda RN  Outcome: Progressing  Flowsheets (Taken 3/17/2023 1521)  Discharge to home or other facility with appropriate resources:   Identify barriers to discharge with patient and caregiver   Arrange for needed discharge resources and transportation as appropriate   Identify discharge learning
Problem: Vaginal Birth or  Section  Goal: Fetal and maternal status remain reassuring during the birth process  Description:  Birth OB-Pregnancy care plan goal which identifies if the fetal and maternal status remain reassuring during the birth process  3/17/2023 2001 by Radha Reilly RN  Outcome: Progressing  Flowsheets (Taken 3/17/2023 1521 by Maria Esther Morel RN)  Fetal and Maternal Status Remain Reassuring During the Birth Process:   Monitor vital signs   Monitor fetal heart rate   Monitor uterine activity   Monitor labor progression (Vaginal delivery)     Problem: Pain  Goal: Verbalizes/displays adequate comfort level or baseline comfort level  3/17/2023 2001 by Radha Reilly RN  Outcome: Progressing  Flowsheets (Taken 3/17/2023 1521 by Maria Esther Morel RN)  Verbalizes/displays adequate comfort level or baseline comfort level:   Encourage patient to monitor pain and request assistance   Assess pain using appropriate pain scale   Administer analgesics based on type and severity of pain and evaluate response     Problem: Infection - Adult  Goal: Absence of infection at discharge  3/17/2023 2001 by Radha Reilly RN  Outcome: Progressing  Flowsheets (Taken 3/17/2023 1521 by Maria Esther Morel RN)  Absence of infection at discharge:   Assess and monitor for signs and symptoms of infection   Monitor lab/diagnostic results   Monitor all insertion sites i.e., indwelling lines, tubes and drains     Problem: Safety - Adult  Goal: Free from fall injury  3/17/2023 2001 by Radha Reilly RN  Outcome: Progressing  Flowsheets (Taken 3/17/2023 0857 by Rommel Armstrong RN)  Free From Fall Injury:   Instruct family/caregiver on patient safety   Based on caregiver fall risk screen, instruct family/caregiver to ask for assistance with transferring infant if caregiver noted to have fall risk factors     Problem: Discharge Planning  Goal: Discharge to home or other facility with appropriate resources  3/17/2023 2001 by Radha Reilly
Problem: Vaginal Birth or  Section  Goal: Fetal and maternal status remain reassuring during the birth process  Description:  Birth OB-Pregnancy care plan goal which identifies if the fetal and maternal status remain reassuring during the birth process  3/18/2023 0746 by Drew Campbell RN  Outcome: Progressing  Flowsheets (Taken 3/18/2023 0746)  Fetal and Maternal Status Remain Reassuring During the Birth Process:   Monitor vital signs   Monitor fetal heart rate   Monitor uterine activity   Monitor labor progression (Vaginal delivery)     Problem: Pain  Goal: Verbalizes/displays adequate comfort level or baseline comfort level  3/18/2023 0746 by Drew Campbell RN  Outcome: Progressing  Flowsheets (Taken 3/18/2023 0746)  Verbalizes/displays adequate comfort level or baseline comfort level:   Assess pain using appropriate pain scale   Encourage patient to monitor pain and request assistance   Implement non-pharmacological measures as appropriate and evaluate response     Problem: Infection - Adult  Goal: Absence of infection at discharge  3/18/2023 0746 by Drew Campbell RN  Outcome: Progressing  Flowsheets (Taken 3/18/2023 0746)  Absence of infection at discharge:   Assess and monitor for signs and symptoms of infection   Identify and instruct in appropriate isolation precautions for identified infection/condition     Problem: Safety - Adult  Goal: Free from fall injury  3/18/2023 0746 by Drew Campbell RN  Outcome: Progressing  Flowsheets (Taken 3/18/2023 0746)  Free From Fall Injury:   Instruct family/caregiver on patient safety   Based on caregiver fall risk screen, instruct family/caregiver to ask for assistance with transferring infant if caregiver noted to have fall risk factors     Problem: Discharge Planning  Goal: Discharge to home or other facility with appropriate resources  3/18/2023 0746 by Drew Campbell RN  Outcome: Progressing  Flowsheets (Taken 3/18/2023 0746)  Discharge to home or
Postpartum  Goal: Establishment of infant feeding pattern  Description:  Postpartum OB-Pregnancy care plan goal which identifies if the mother is establishing a feeding pattern with their   3/19/2023 2027 by Fannie Jalloh RN  Outcome: Progressing  Flowsheets (Taken 3/19/2023 1944)  Establishment of Infant Feeding Pattern:   Assess breast/bottle feeding   Refer to lactation as needed     Problem: Postpartum  Goal: Appropriate maternal -  bonding  Description:  Postpartum OB-Pregnancy care plan goal which identifies if the mother and  are bonding appropriately  3/19/2023 2027 by Fannie Jalloh RN  Outcome: Progressing  Note: Bonding with baby, participating in infant care. Problem: Postpartum  Goal: Experiences normal postpartum course  Description:  Postpartum OB-Pregnancy care plan goal which identifies if the mother is experiencing a normal postpartum course  3/19/2023 2027 by Fannie Jalloh RN  Outcome: Progressing  Flowsheets (Taken 3/19/2023 1944)  Experiences Normal Postpartum Course:   Monitor maternal vital signs   Assess uterine involution   Care plan reviewed with patient. Patient verbalized understanding of the plan of care and contribute to goal setting.
RN  Outcome: Progressing  Flowsheets (Taken 3/17/2023 0857)  Discharge to home or other facility with appropriate resources:   Refer to discharge planning if patient needs post-hospital services based on physician order or complex needs related to functional status, cognitive ability or social support system   Arrange for needed discharge resources and transportation as appropriate     Problem: Chronic Conditions and Co-morbidities  Goal: Patient's chronic conditions and co-morbidity symptoms are monitored and maintained or improved  3/17/2023 0857 by Ancelmo Rojas RN  Outcome: Progressing  Flowsheets (Taken 3/17/2023 0857)  Care Plan - Patient's Chronic Conditions and Co-Morbidity Symptoms are Monitored and Maintained or Improved:   Monitor and assess patient's chronic conditions and comorbid symptoms for stability, deterioration, or improvement   Update acute care plan with appropriate goals if chronic or comorbid symptoms are exacerbated and prevent overall improvement and discharge   Collaborate with multidisciplinary team to address chronic and comorbid conditions and prevent exacerbation or deterioration  Note: Pt is Type 1 diabetic and has her own insulin pump    Care plan reviewed with patient and her spouse Marlon Form. Patient and her spouse Marlon Form verbalize understanding of the plan of care and contribute to goal setting.
discharge resources and transportation as appropriate   Identify discharge learning needs (meds, wound care, etc)     Problem: Chronic Conditions and Co-morbidities  Goal: Patient's chronic conditions and co-morbidity symptoms are monitored and maintained or improved  Outcome: Progressing  Flowsheets (Taken 3/17/2023 0604)  Care Plan - Patient's Chronic Conditions and Co-Morbidity Symptoms are Monitored and Maintained or Improved:   Monitor and assess patient's chronic conditions and comorbid symptoms for stability, deterioration, or improvement   Update acute care plan with appropriate goals if chronic or comorbid symptoms are exacerbated and prevent overall improvement and discharge   Care plan reviewed with patient and FOB. Patient and FOB verbalize understanding of the plan of care and contribute to goal setting.
Flowsheet Documentation  Taken 3/19/2023 0808 by Leonel Rowland RN  Verbalizes/displays adequate comfort level or baseline comfort level:   Encourage patient to monitor pain and request assistance   Assess pain using appropriate pain scale   Administer analgesics based on type and severity of pain and evaluate response   Implement non-pharmacological measures as appropriate and evaluate response     Problem: Chronic Conditions and Co-morbidities  Goal: Patient's chronic conditions and co-morbidity symptoms are monitored and maintained or improved  Recent Flowsheet Documentation  Taken 3/19/2023 1722 by Leonel Rowland RN  Care Plan - Patient's Chronic Conditions and Co-Morbidity Symptoms are Monitored and Maintained or Improved:   Monitor and assess patient's chronic conditions and comorbid symptoms for stability, deterioration, or improvement   Collaborate with multidisciplinary team to address chronic and comorbid conditions and prevent exacerbation or deterioration   Update acute care plan with appropriate goals if chronic or comorbid symptoms are exacerbated and prevent overall improvement and discharge   Care plan reviewed with patient and she contributes to goal setting and voices understanding of plan of care.
appropriate resources  3/20/2023 0849 by Dilica Swift RN  Outcome: Completed  Note: Home today, no needs expressed  3/19/2023 2027 by Willian Mccarty RN  Outcome: Progressing  Flowsheets (Taken 3/19/2023 1944)  Discharge to home or other facility with appropriate resources: Identify barriers to discharge with patient and caregiver  Note: Remains in hospital, discussed possible discharge needs. Problem: Chronic Conditions and Co-morbidities  Goal: Patient's chronic conditions and co-morbidity symptoms are monitored and maintained or improved  3/20/2023 0849 by Dilcia Swift RN  Outcome: Completed  Note: Blood sugars controlled, monitored per patient  3/19/2023 2027 by Willian Mccarty RN  Outcome: Progressing  Flowsheets (Taken 3/19/2023 1722 by Gentry Begum RN)  Care Plan - Patient's Chronic Conditions and Co-Morbidity Symptoms are Monitored and Maintained or Improved:   Monitor and assess patient's chronic conditions and comorbid symptoms for stability, deterioration, or improvement   Collaborate with multidisciplinary team to address chronic and comorbid conditions and prevent exacerbation or deterioration   Update acute care plan with appropriate goals if chronic or comorbid symptoms are exacerbated and prevent overall improvement and discharge   Care plan reviewed with patient and she contributes to goal setting and voices understanding of plan of care.

## 2024-01-04 ENCOUNTER — NURSE ONLY (OUTPATIENT)
Dept: LAB | Age: 29
End: 2024-01-04

## 2024-01-05 LAB
SOURCE: NORMAL
SOURCE: NORMAL

## 2024-01-09 LAB
C. TRACHOMATIS DNA,THIN PREP: NEGATIVE
N. GONORRHOEAE DNA, THIN PREP: NEGATIVE
SOURCE: NORMAL

## 2024-01-10 LAB
SOURCE: NORMAL
TRICHOMONAS VAGINALI, MOLECULAR: NEGATIVE

## 2024-01-22 LAB — CYTOLOGY THIN PREP PAP: NORMAL

## 2024-03-11 ENCOUNTER — HOSPITAL ENCOUNTER (OUTPATIENT)
Dept: WOMENS IMAGING | Age: 29
Discharge: HOME | End: 2024-03-11
Attending: OBSTETRICS & GYNECOLOGY
Payer: COMMERCIAL

## 2024-03-11 VITALS — WEIGHT: 275 LBS | BODY MASS INDEX: 45.82 KG/M2 | HEIGHT: 65 IN

## 2024-03-11 DIAGNOSIS — Z3A.01 1 WEEK GESTATION OF PREGNANCY: ICD-10-CM

## 2024-03-11 DIAGNOSIS — N64.4 PAIN OF LEFT BREAST: ICD-10-CM

## 2024-03-11 PROCEDURE — 76642 ULTRASOUND BREAST LIMITED: CPT

## 2024-04-22 ENCOUNTER — HOSPITAL ENCOUNTER (OUTPATIENT)
Age: 29
Discharge: HOME | End: 2024-04-23
Attending: OBSTETRICS & GYNECOLOGY | Admitting: OBSTETRICS & GYNECOLOGY
Payer: COMMERCIAL

## 2024-04-22 PROBLEM — O26.90 PREGNANCY COMPLICATION, ANTEPARTUM: Status: ACTIVE | Noted: 2024-04-22

## 2024-04-22 LAB
ALBUMIN SERPL BCG-MCNC: 3.2 G/DL (ref 3.5–5.1)
ALP SERPL-CCNC: 89 U/L (ref 38–126)
ALT SERPL W/O P-5'-P-CCNC: 12 U/L (ref 11–66)
ANION GAP SERPL CALC-SCNC: 15 MEQ/L (ref 8–16)
AST SERPL-CCNC: 18 U/L (ref 5–40)
BASOPHILS ABSOLUTE: 0.1 THOU/MM3 (ref 0–0.1)
BASOPHILS NFR BLD AUTO: 0.7 %
BILIRUB SERPL-MCNC: 0.8 MG/DL (ref 0.3–1.2)
BUN SERPL-MCNC: 9 MG/DL (ref 7–22)
CALCIUM SERPL-MCNC: 7.7 MG/DL (ref 8.5–10.5)
CHLORIDE SERPL-SCNC: 105 MEQ/L (ref 98–111)
CO2 SERPL-SCNC: 17 MEQ/L (ref 23–33)
CREAT SERPL-MCNC: 0.5 MG/DL (ref 0.4–1.2)
DEPRECATED RDW RBC AUTO: 49.8 FL (ref 35–45)
EOSINOPHIL NFR BLD AUTO: 0.7 %
EOSINOPHILS ABSOLUTE: 0.1 THOU/MM3 (ref 0–0.4)
ERYTHROCYTE [DISTWIDTH] IN BLOOD BY AUTOMATED COUNT: 14.8 % (ref 11.5–14.5)
GFR SERPL CREATININE-BSD FRML MDRD: > 90 ML/MIN/1.73M2
GLUCOSE BLD STRIP.AUTO-MCNC: 138 MG/DL (ref 70–108)
GLUCOSE SERPL-MCNC: 148 MG/DL (ref 70–108)
HCT VFR BLD AUTO: 36 % (ref 37–47)
HGB BLD-MCNC: 11.9 GM/DL (ref 12–16)
IMM GRANULOCYTES # BLD AUTO: 0.12 THOU/MM3 (ref 0–0.07)
IMM GRANULOCYTES NFR BLD AUTO: 1.6 %
LYMPHOCYTES ABSOLUTE: 1 THOU/MM3 (ref 1–4.8)
LYMPHOCYTES NFR BLD AUTO: 13.8 %
MCH RBC QN AUTO: 30.3 PG (ref 26–33)
MCHC RBC AUTO-ENTMCNC: 33.1 GM/DL (ref 32.2–35.5)
MCV RBC AUTO: 91.6 FL (ref 81–99)
MONOCYTES ABSOLUTE: 0.4 THOU/MM3 (ref 0.4–1.3)
MONOCYTES NFR BLD AUTO: 5.4 %
NEUTROPHILS NFR BLD AUTO: 77.8 %
NRBC BLD AUTO-RTO: 0 /100 WBC
PLATELET # BLD AUTO: 158 THOU/MM3 (ref 130–400)
PMV BLD AUTO: 9.9 FL (ref 9.4–12.4)
POTASSIUM SERPL-SCNC: 3.7 MEQ/L (ref 3.5–5.2)
PROT SERPL-MCNC: 6 G/DL (ref 6.1–8)
RBC # BLD AUTO: 3.93 MILL/MM3 (ref 4.2–5.4)
SEGMENTED NEUTROPHILS ABSOLUTE COUNT: 5.8 THOU/MM3 (ref 1.8–7.7)
SODIUM SERPL-SCNC: 137 MEQ/L (ref 135–145)
WBC # BLD AUTO: 7.5 THOU/MM3 (ref 4.8–10.8)

## 2024-04-22 PROCEDURE — 85025 COMPLETE CBC W/AUTO DIFF WBC: CPT

## 2024-04-22 PROCEDURE — 2580000003 HC RX 258: Performed by: OBSTETRICS & GYNECOLOGY

## 2024-04-22 PROCEDURE — 82948 REAGENT STRIP/BLOOD GLUCOSE: CPT

## 2024-04-22 PROCEDURE — 36415 COLL VENOUS BLD VENIPUNCTURE: CPT

## 2024-04-22 PROCEDURE — 6360000002 HC RX W HCPCS: Performed by: OBSTETRICS & GYNECOLOGY

## 2024-04-22 PROCEDURE — 80053 COMPREHEN METABOLIC PANEL: CPT

## 2024-04-22 RX ORDER — ONDANSETRON 2 MG/ML
8 INJECTION INTRAMUSCULAR; INTRAVENOUS EVERY 8 HOURS PRN
Status: DISCONTINUED | OUTPATIENT
Start: 2024-04-22 | End: 2024-04-23 | Stop reason: HOSPADM

## 2024-04-22 RX ORDER — ASPIRIN 81 MG/1
81 TABLET, CHEWABLE ORAL DAILY
COMMUNITY

## 2024-04-22 RX ORDER — SODIUM CHLORIDE 9 MG/ML
INJECTION, SOLUTION INTRAVENOUS CONTINUOUS
Status: DISCONTINUED | OUTPATIENT
Start: 2024-04-22 | End: 2024-04-23 | Stop reason: HOSPADM

## 2024-04-22 RX ADMIN — SODIUM CHLORIDE: 9 INJECTION, SOLUTION INTRAVENOUS at 22:25

## 2024-04-22 RX ADMIN — ONDANSETRON 8 MG: 2 INJECTION INTRAMUSCULAR; INTRAVENOUS at 22:34

## 2024-04-22 RX ADMIN — SODIUM CHLORIDE: 9 INJECTION, SOLUTION INTRAVENOUS at 23:26

## 2024-04-23 VITALS
DIASTOLIC BLOOD PRESSURE: 64 MMHG | WEIGHT: 279 LBS | TEMPERATURE: 96.6 F | HEIGHT: 66 IN | SYSTOLIC BLOOD PRESSURE: 119 MMHG | RESPIRATION RATE: 16 BRPM | HEART RATE: 98 BPM | OXYGEN SATURATION: 98 % | BODY MASS INDEX: 44.84 KG/M2

## 2024-04-23 LAB — GLUCOSE BLD STRIP.AUTO-MCNC: 115 MG/DL (ref 70–108)

## 2024-04-23 PROCEDURE — 96374 THER/PROPH/DIAG INJ IV PUSH: CPT

## 2024-04-23 PROCEDURE — 82948 REAGENT STRIP/BLOOD GLUCOSE: CPT

## 2024-04-23 PROCEDURE — 96361 HYDRATE IV INFUSION ADD-ON: CPT

## 2024-04-23 NOTE — FLOWSHEET NOTE
Discharge instructions explained and written copy provided, teachback performed and patient denies any further questions at this time, is agreeable to POC. IV removed.

## 2024-04-23 NOTE — PLAN OF CARE
Problem: Pain  Goal: Verbalizes/displays adequate comfort level or baseline comfort level  Outcome: Progressing  Flowsheets (Taken 4/22/2024 2310)  Verbalizes/displays adequate comfort level or baseline comfort level:   Encourage patient to monitor pain and request assistance   Administer analgesics based on type and severity of pain and evaluate response   Assess pain using appropriate pain scale   Implement non-pharmacological measures as appropriate and evaluate response     Problem: Safety - Adult  Goal: Free from fall injury  Outcome: Progressing  Flowsheets (Taken 4/22/2024 2310)  Free From Fall Injury: Instruct family/caregiver on patient safety     Problem: Infection - Adult  Goal: Absence of infection during hospitalization  Outcome: Progressing  Flowsheets (Taken 4/22/2024 2310)  Absence of infection during hospitalization: Instruct and encourage patient and family to use good hand hygiene technique     Problem: Discharge Planning  Goal: Discharge to home or other facility with appropriate resources  Outcome: Progressing  Flowsheets (Taken 4/22/2024 2310)  Discharge to home or other facility with appropriate resources: Arrange for needed discharge resources and transportation as appropriate     Problem: Chronic Conditions and Co-morbidities  Goal: Patient's chronic conditions and co-morbidity symptoms are monitored and maintained or improved  Outcome: Progressing  Flowsheets (Taken 4/22/2024 2310)  Care Plan - Patient's Chronic Conditions and Co-Morbidity Symptoms are Monitored and Maintained or Improved: Monitor and assess patient's chronic conditions and comorbid symptoms for stability, deterioration, or improvement  Care plan reviewed with patient.  Patient verbalize understanding of the plan of care and contribute to goal setting.

## 2024-04-23 NOTE — DISCHARGE INSTRUCTIONS
Home Undelivered Discharge Instructions    After Discharge Orders:           Diet: regular diet   Increase fluid intake to 8-10 glasses of water daily    Rest: normal activity as tolerated    Other instructions: Do kick counts once a day on your baby. Choose the time of day your baby is most active. Get in a comfortable lying or sitting position and time how long it takes to feel 10 kicks, twists, turns, swishes, or rolls.     Call Your Doctor if Any of the Following Occurs   Leaking fluid from vagina with or without contractions  Bright red vaginal bleeding occurs that is as heavy as or heavier than a period  Regular contractions are longer, stronger, and closer together  Noticeable decreased fetal movement  Elevated temperature >100.5°F and chills  Blurred vision, spots before eyes, unrelievable headache, severe facial swelling, or upper abdominal pain  Contact physician or hospital OB unit if signs of premature labor occur at ?36 weeks  Persistent or rhythmic low back pain that feels different than you are used to  Menstrual like cramps  Intestinal cramps with or without diarrhea  Pelvic pressure or rhythmic tightening that feels different than you are used to  Watery discharge or a gush of fluid from your vagina  Vaginal bleeding as heavy as a period  General Information     Difference between:  False Labor True Labor   1. Contractions often are irregular and don't consistently get closer together (called Brant-Cabrera contractions) 1. Contractions come at regular intervals and, as time goes on, get closer together.   2. Contractions may often stop when you rest or with a position change. 2. Contractions continue despite movement or walking. Contractions get stronger and closer together with time.   3. Often felt in the lower abdomen. 3. Usually felt in back coming around to the front.     Reminder to Patient   Please bring all teaching sheets and discharge information with you if you return to the hospital or

## 2024-04-23 NOTE — FLOWSHEET NOTE
Dr Thorne returns call, reviews patient's h and p, vitals, and FHR. Doctor asks about patient's sugars as she is diabetic, is told they have been \"fine all day\" per patient. Doctor states to still check one with the accucheck and let her know if it is high, and to place an IV and run a 2L bolus of normal saline, followed by a rate of 150 mL/hr thereafter. Doctor orders patient may have 8mg of zofran IV q8hrs. Doctor also orders to draw a cbc and cmp.

## 2024-04-23 NOTE — FLOWSHEET NOTE
Dr Thorne calls, informed that patient's blood sugar is 138 and that her insulin pump is activated. No change in POC

## 2024-04-23 NOTE — FLOWSHEET NOTE
Patient's blood sugar 138 per accucheck monitor. Patient states her monitor has administered insulin and blood sugar should be coming down according to her rajni. which monitors her sub q insulin pump continuously. Will inform doctor of results as they are above patient's benchmark of 110.

## 2024-04-23 NOTE — PROCEDURES
[place] [id number]                             NON STRESS TEST      PATIENT NAME: FAUSTO DE GUZMAN          : 1995  MED REC NO: [id number]                       ROOM: [facility]  ACCOUNT NO: [id number]                       ADMIT DATE: 2024  PROVIDER: Josefina Thorne MD      DATE OF STUDY:  2024    This is a 28-year-old, G3, P1, at 25 weeks coming in with complaints of nausea and vomiting.  Fetal heart tones are in the 140s, moderate variability, positive accelerations.  [place] none.  She had a reactive NST.          JOSEFINA THORNE MD      D:  2024 08:52:40     T:  2024 09:28:48     CANDICE/JUAN  Job #:  [id number]     Doc#:  [id number]      normal...

## 2024-04-23 NOTE — FLOWSHEET NOTE
Dr Thorne calls, reviews patient's labs, FHT, vitals, blood sugars, and current symptoms. Doctor states patient may be discharged.

## 2024-04-23 NOTE — FLOWSHEET NOTE
24 week 6 day patient of Dr. Mane's arrives from ED via wheelchair with complaints of nausea and vomiting for the past 48 hours. Patient notes had called physician on-call number and was advised to come in per Dr. Thorne. She states she cannot keep anything down, and that she thinks she is feeling the baby move less than usual. She denies any bleeding, cramping, or leaking of fluid. Patient given gown and to restroom to change.

## 2024-05-14 ENCOUNTER — HOSPITAL ENCOUNTER (OUTPATIENT)
Dept: NURSING | Age: 29
Discharge: HOME | End: 2024-05-14
Payer: COMMERCIAL

## 2024-05-14 VITALS
TEMPERATURE: 97.4 F | SYSTOLIC BLOOD PRESSURE: 129 MMHG | HEART RATE: 96 BPM | RESPIRATION RATE: 16 BRPM | DIASTOLIC BLOOD PRESSURE: 70 MMHG | OXYGEN SATURATION: 98 %

## 2024-05-14 LAB
ANTIBODY SCREEN: NORMAL
GA (WEEKS): NORMAL WK

## 2024-05-14 PROCEDURE — 86850 RBC ANTIBODY SCREEN: CPT

## 2024-05-14 PROCEDURE — 96372 THER/PROPH/DIAG INJ SC/IM: CPT

## 2024-05-14 NOTE — DISCHARGE INSTRUCTIONS
ACTIVITY:  Continue usual care with your doctor. Call your doctor immediately if any severe problems or go to the nearest emergency room.      I have been treated and hereby acknowledge receiving this instruction sheet.                    TAKE RHOGAM INJECTION CARD WITH YOU TO DELIVERY.

## 2024-05-14 NOTE — PROGRESS NOTES
1030- Patient arrived to [facility] ambulatory for Rhogam injection. Patient confirms she is 28 weeks pregnant. Vitals stable. Lab work obtained per order. Call light placed within reach. PT RIGHTS AND RESPONSIBILITIES OFFERED TO PT.    1117- Injection given. Patient tolerated well with no issues. Rhogam injection card given to patient.     1120- Discharge instructions reviewed with patient. Verbalized understanding with no further questions. AVS provided. Discharged ambulatory to [facility] in stable condition.           __M__ Safety:       (Environmental)  [place] to environment  Ensure ID band is correct and in place/ allergy band as needed  Assess for fall risk  Initiate fall precautions as applicable (fall band, side rails, etc.)  Call light within reach  Bed in low position/ wheels locked    __M__ Pain:       Assess pain level and characteristics  Administer analgesics as ordered  Assess effectiveness of pain management and report to MD as needed    __M__ Knowledge Deficit:  Assess baseline knowledge  Provide teaching at level of understanding  Provide teaching via preferred learning method  Evaluate teaching effectiveness    __M__ Hemodynamic/Respiratory Status:       (Pre and Post Procedure Monitoring)  Assess/Monitor vital signs and LOC  Assess Baseline SpO2 prior to any sedation  Obtain weight/height  Assess vital signs/ LOC until patient meets discharge criteria  Monitor procedure site and notify MD of any issues

## 2024-06-18 ENCOUNTER — HOSPITAL ENCOUNTER (OUTPATIENT)
Dept: PHYSICAL THERAPY | Age: 29
Setting detail: THERAPIES SERIES
Discharge: HOME | End: 2024-06-18
Payer: COMMERCIAL

## 2024-06-18 PROCEDURE — 97530 THERAPEUTIC ACTIVITIES: CPT

## 2024-06-18 PROCEDURE — 97166 OT EVAL MOD COMPLEX 45 MIN: CPT

## 2024-06-18 PROCEDURE — 97112 NEUROMUSCULAR REEDUCATION: CPT

## 2024-06-18 NOTE — THERAPY EVALUATION
** PLEASE SIGN, DATE AND TIME CERTIFICATION BELOW AND RETURN TO [facility] OUTPATIENT REHABILITATION (FAX #: [phone]).  ATTEST/CO-SIGN IF ACCESSING VIA [employer].  THANK YOU.**    I certify that I have examined the patient below and determined that Physical Medicine and Rehabilitation service is necessary and that I approve the established plan of care for up to 90 days or as specifically noted.  Attestation, signature or co-signature of physician indicates approval of certification requirements.    ________________________ ____________ __________  Physician Signature   Date   Time  [facility]  OCCUPATIONAL THERAPY  OUTPATIENT REHABILITATION - SPECIALIZED THERAPY SERVICES  [x] PELVIC HEALTH EVALUATION  [] DAILY NOTE  [] PROGRESS NOTE [] DISCHARGE NOTE    Date: 2024  Patient Name:  Paloma Schumacher  : 1995  MRN: [id number]  CSN: [id number]    Referring Practitioner Josefina Thorne MD    Diagnosis  N39.3    Treatment Diagnosis M62.58 - Muscle Wasting and Atrophy, nec, other site  M62.81 - Muscle Weakness (Generalized)  M54.5 - Low Back Pain  N39.41 - Urge Incontinence  N81.84 - Pelvic Muscle Wasting (Female) and N39.3 - Stress Incontinence female  R39.15 - Urgency of Urination  R35.1 - Nocturia   Date of Evaluation 24    Additional Pertinent History Past Medical History:   Diagnosis Date    Asthma     exercise induced    Diabetes mellitus (HCC)     Headache     Thyroid disease     hashimotos    UTI (urinary tract infection)      Past Surgical History:   Procedure Laterality Date    LAPAROSCOPY      TONSILLECTOMY  2014    shorten uvula    WISDOM TOOTH EXTRACTION           Functional Outcome Measure Used IIQ & ANAHI   Functional Outcome Score 16  & 14 (24)       Insurance: Primary: Payor: [employer] /  /  / ,   Secondary:    Authorization Information: No precert needed   Visit # 1, 1/10 for progress note (Reporting Period: 24 to   Present  )   Visits

## 2024-07-01 ENCOUNTER — HOSPITAL ENCOUNTER (OUTPATIENT)
Dept: PHYSICAL THERAPY | Age: 29
Setting detail: THERAPIES SERIES
Discharge: HOME | End: 2024-07-01

## 2024-07-01 NOTE — PROGRESS NOTES
ability to delay urgency for 5' with good control to enable time to get to the bathroom.    Pt will demonstrate independence with basic HEP designed to increase PFM and core strength and to enhance hip girdle flexibility for increased ease of strengthening.   5.  This pt will reduce nocturia to 0 times per night to establish more restful sleep patterns.    6.  This pt will demo proper use of pelvic brace lifting, push, and pull with pelvic brace in order to promote continence during functional tasks.    7. Pt will ID at least 3 techniques she can use daily to reduce superior perineum pain for greater tolerance of child rearing tasks.      Long Term Goals: 12 weeks  This pt will report a 75% decrease in incontinence frequency and a 75& decrease in amount to increase comfort in public and reduce risk of integumentary compromise.   This pt will report normal urinary frequency to enabling ease of work/home task completion without interruption.    This pt will abolish pad use in order to reduce financial strain and promote comfort in public situations.  This pt will decrease IIQ and IUD scores to 6 or less respectively to indicate improved continence and efficacy of treatment  This pt will demo independence with advanced HEP in order to allow gains in therapy to be maintained long term and reduce the risk of further medical need/assessment.    This pt will demo PFM PERF 3/8/10/10 in order to increase continence in functional positions during home tasks.  This pt will demo 4+/ strength of the core with habitual bottom to top contraction in order to increase pelvic organ support during cough/sneeze/lift and to promote continence via effective pelvic brace.    The pt will abolish nocturia in order to attain restful sleep patterns.    9.The pt to normalize SI alignment in order to enhance ease of strengthening core and pelvic floor via improved alignment of prime  origin and insertion and to reduce risk of injury

## 2024-07-24 ENCOUNTER — HOSPITAL ENCOUNTER (INPATIENT)
Age: 29
LOS: 2 days | Discharge: HOME | End: 2024-07-26
Attending: OBSTETRICS & GYNECOLOGY | Admitting: OBSTETRICS & GYNECOLOGY
Payer: COMMERCIAL

## 2024-07-24 ENCOUNTER — ANESTHESIA (OUTPATIENT)
Dept: LABOR AND DELIVERY | Age: 29
End: 2024-07-24
Payer: COMMERCIAL

## 2024-07-24 ENCOUNTER — ANESTHESIA EVENT (OUTPATIENT)
Dept: LABOR AND DELIVERY | Age: 29
End: 2024-07-24
Payer: COMMERCIAL

## 2024-07-24 ENCOUNTER — APPOINTMENT (OUTPATIENT)
Dept: LABOR AND DELIVERY | Age: 29
End: 2024-07-24
Payer: COMMERCIAL

## 2024-07-24 PROBLEM — Z34.90 ENCOUNTER FOR INDUCTION OF LABOR: Status: ACTIVE | Noted: 2024-07-24

## 2024-07-24 LAB
AMPHETAMINES UR QL SCN: NEGATIVE
BARBITURATES UR QL SCN: NEGATIVE
BASOPHILS ABSOLUTE: 0.1 THOU/MM3 (ref 0–0.1)
BASOPHILS NFR BLD AUTO: 0.6 %
BENZODIAZ UR QL SCN: NEGATIVE
BZE UR QL SCN: NEGATIVE
CANNABINOIDS UR QL SCN: NEGATIVE
DEPRECATED RDW RBC AUTO: 48.5 FL (ref 35–45)
EOSINOPHIL NFR BLD AUTO: 0.6 %
EOSINOPHILS ABSOLUTE: 0.1 THOU/MM3 (ref 0–0.4)
ERYTHROCYTE [DISTWIDTH] IN BLOOD BY AUTOMATED COUNT: 15.9 % (ref 11.5–14.5)
FENTANYL: NEGATIVE
GLUCOSE BLD STRIP.AUTO-MCNC: 103 MG/DL (ref 70–108)
GLUCOSE BLD STRIP.AUTO-MCNC: 63 MG/DL (ref 70–108)
GLUCOSE BLD STRIP.AUTO-MCNC: 64 MG/DL (ref 70–108)
GLUCOSE BLD STRIP.AUTO-MCNC: 81 MG/DL (ref 70–108)
GLUCOSE BLD STRIP.AUTO-MCNC: 83 MG/DL (ref 70–108)
GLUCOSE BLD STRIP.AUTO-MCNC: 83 MG/DL (ref 70–108)
GLUCOSE BLD STRIP.AUTO-MCNC: 86 MG/DL (ref 70–108)
HCT VFR BLD AUTO: 33 % (ref 37–47)
HGB BLD-MCNC: 10.3 GM/DL (ref 12–16)
IMM GRANULOCYTES # BLD AUTO: 0.11 THOU/MM3 (ref 0–0.07)
IMM GRANULOCYTES NFR BLD AUTO: 1.1 %
LYMPHOCYTES ABSOLUTE: 2.4 THOU/MM3 (ref 1–4.8)
LYMPHOCYTES NFR BLD AUTO: 23.4 %
MCH RBC QN AUTO: 26.5 PG (ref 26–33)
MCHC RBC AUTO-ENTMCNC: 31.2 GM/DL (ref 32.2–35.5)
MCV RBC AUTO: 85.1 FL (ref 81–99)
MONOCYTES ABSOLUTE: 0.6 THOU/MM3 (ref 0.4–1.3)
MONOCYTES NFR BLD AUTO: 6.2 %
NEUTROPHILS ABSOLUTE: 7 THOU/MM3 (ref 1.8–7.7)
NEUTROPHILS NFR BLD AUTO: 68.1 %
NRBC BLD AUTO-RTO: 0 /100 WBC
OPIATES UR QL SCN: NEGATIVE
OXYCODONE: NEGATIVE
PCP UR QL SCN: NEGATIVE
PLATELET # BLD AUTO: 204 THOU/MM3 (ref 130–400)
PMV BLD AUTO: 10.7 FL (ref 9.4–12.4)
RBC # BLD AUTO: 3.88 MILL/MM3 (ref 4.2–5.4)
RPR SER QL: NONREACTIVE
WBC # BLD AUTO: 10.3 THOU/MM3 (ref 4.8–10.8)

## 2024-07-24 PROCEDURE — 6360000002 HC RX W HCPCS: Performed by: OBSTETRICS & GYNECOLOGY

## 2024-07-24 PROCEDURE — 6370000000 HC RX 637 (ALT 250 FOR IP): Performed by: OBSTETRICS & GYNECOLOGY

## 2024-07-24 PROCEDURE — 7200000001 HC VAGINAL DELIVERY

## 2024-07-24 PROCEDURE — 86850 RBC ANTIBODY SCREEN: CPT

## 2024-07-24 PROCEDURE — 3700000025 EPIDURAL BLOCK: Performed by: ANESTHESIOLOGY

## 2024-07-24 PROCEDURE — 2580000003 HC RX 258: Performed by: OBSTETRICS & GYNECOLOGY

## 2024-07-24 PROCEDURE — 86592 SYPHILIS TEST NON-TREP QUAL: CPT

## 2024-07-24 PROCEDURE — 3E033VJ INTRODUCTION OF OTHER HORMONE INTO PERIPHERAL VEIN, PERCUTANEOUS APPROACH: ICD-10-PCS | Performed by: OBSTETRICS & GYNECOLOGY

## 2024-07-24 PROCEDURE — 82948 REAGENT STRIP/BLOOD GLUCOSE: CPT

## 2024-07-24 PROCEDURE — 85025 COMPLETE CBC W/AUTO DIFF WBC: CPT

## 2024-07-24 PROCEDURE — 86885 COOMBS TEST INDIRECT QUAL: CPT

## 2024-07-24 PROCEDURE — 10907ZC DRAINAGE OF AMNIOTIC FLUID, THERAPEUTIC FROM PRODUCTS OF CONCEPTION, VIA NATURAL OR ARTIFICIAL OPENING: ICD-10-PCS | Performed by: OBSTETRICS & GYNECOLOGY

## 2024-07-24 PROCEDURE — 0KQM0ZZ REPAIR PERINEUM MUSCLE, OPEN APPROACH: ICD-10-PCS | Performed by: OBSTETRICS & GYNECOLOGY

## 2024-07-24 PROCEDURE — 86900 BLOOD TYPING SEROLOGIC ABO: CPT

## 2024-07-24 PROCEDURE — 2500000003 HC RX 250 WO HCPCS: Performed by: ANESTHESIOLOGY

## 2024-07-24 PROCEDURE — 80307 DRUG TEST PRSMV CHEM ANLYZR: CPT

## 2024-07-24 PROCEDURE — 1200000000 HC SEMI PRIVATE

## 2024-07-24 PROCEDURE — 2500000003 HC RX 250 WO HCPCS: Performed by: OBSTETRICS & GYNECOLOGY

## 2024-07-24 PROCEDURE — 86901 BLOOD TYPING SEROLOGIC RH(D): CPT

## 2024-07-24 RX ORDER — OXYTOCIN/0.9 % SODIUM CHLORIDE 30/500 ML
87.3 PLASTIC BAG, INJECTION (ML) INTRAVENOUS PRN
Status: ACTIVE | OUTPATIENT
Start: 2024-07-24 | End: 2024-07-26

## 2024-07-24 RX ORDER — TRANEXAMIC ACID 10 MG/ML
1000 INJECTION, SOLUTION INTRAVENOUS
Status: DISCONTINUED | OUTPATIENT
Start: 2024-07-24 | End: 2024-07-24 | Stop reason: HOSPADM

## 2024-07-24 RX ORDER — SODIUM CHLORIDE 9 MG/ML
INJECTION, SOLUTION INTRAVENOUS PRN
Status: DISCONTINUED | OUTPATIENT
Start: 2024-07-24 | End: 2024-07-24 | Stop reason: HOSPADM

## 2024-07-24 RX ORDER — TERBUTALINE SULFATE 1 MG/ML
0.25 INJECTION, SOLUTION SUBCUTANEOUS
Status: DISCONTINUED | OUTPATIENT
Start: 2024-07-24 | End: 2024-07-24 | Stop reason: HOSPADM

## 2024-07-24 RX ORDER — NALOXONE HYDROCHLORIDE 0.4 MG/ML
INJECTION, SOLUTION INTRAMUSCULAR; INTRAVENOUS; SUBCUTANEOUS PRN
Status: DISCONTINUED | OUTPATIENT
Start: 2024-07-24 | End: 2024-07-24 | Stop reason: HOSPADM

## 2024-07-24 RX ORDER — ONDANSETRON 2 MG/ML
4 INJECTION INTRAMUSCULAR; INTRAVENOUS EVERY 6 HOURS PRN
Status: DISCONTINUED | OUTPATIENT
Start: 2024-07-24 | End: 2024-07-24 | Stop reason: HOSPADM

## 2024-07-24 RX ORDER — SODIUM CHLORIDE 0.9 % (FLUSH) 0.9 %
5-40 SYRINGE (ML) INJECTION EVERY 12 HOURS SCHEDULED
Status: DISCONTINUED | OUTPATIENT
Start: 2024-07-24 | End: 2024-07-24 | Stop reason: HOSPADM

## 2024-07-24 RX ORDER — GLUCAGON 1 MG/ML
1 KIT INJECTION PRN
Status: DISCONTINUED | OUTPATIENT
Start: 2024-07-24 | End: 2024-07-24

## 2024-07-24 RX ORDER — ONDANSETRON 2 MG/ML
4 INJECTION INTRAMUSCULAR; INTRAVENOUS EVERY 6 HOURS PRN
Status: DISCONTINUED | OUTPATIENT
Start: 2024-07-24 | End: 2024-07-26 | Stop reason: HOSPADM

## 2024-07-24 RX ORDER — ONDANSETRON 4 MG/1
4 TABLET, ORALLY DISINTEGRATING ORAL EVERY 6 HOURS PRN
Status: DISCONTINUED | OUTPATIENT
Start: 2024-07-24 | End: 2024-07-24

## 2024-07-24 RX ORDER — DIPHENHYDRAMINE HCL 25 MG
25 TABLET ORAL EVERY 4 HOURS PRN
Status: DISCONTINUED | OUTPATIENT
Start: 2024-07-24 | End: 2024-07-24 | Stop reason: HOSPADM

## 2024-07-24 RX ORDER — SODIUM CHLORIDE, SODIUM LACTATE, POTASSIUM CHLORIDE, CALCIUM CHLORIDE 600; 310; 30; 20 MG/100ML; MG/100ML; MG/100ML; MG/100ML
INJECTION, SOLUTION INTRAVENOUS CONTINUOUS
Status: DISCONTINUED | OUTPATIENT
Start: 2024-07-24 | End: 2024-07-24 | Stop reason: HOSPADM

## 2024-07-24 RX ORDER — OXYTOCIN/0.9 % SODIUM CHLORIDE 30/500 ML
1-20 PLASTIC BAG, INJECTION (ML) INTRAVENOUS CONTINUOUS
Status: DISCONTINUED | OUTPATIENT
Start: 2024-07-24 | End: 2024-07-24 | Stop reason: HOSPADM

## 2024-07-24 RX ORDER — ACETAMINOPHEN 500 MG
1000 TABLET ORAL EVERY 8 HOURS SCHEDULED
Status: DISCONTINUED | OUTPATIENT
Start: 2024-07-25 | End: 2024-07-26 | Stop reason: HOSPADM

## 2024-07-24 RX ORDER — DOCUSATE SODIUM 100 MG/1
100 CAPSULE, LIQUID FILLED ORAL 2 TIMES DAILY
Status: DISCONTINUED | OUTPATIENT
Start: 2024-07-24 | End: 2024-07-26 | Stop reason: HOSPADM

## 2024-07-24 RX ORDER — MODIFIED LANOLIN
OINTMENT (GRAM) TOPICAL PRN
Status: DISCONTINUED | OUTPATIENT
Start: 2024-07-24 | End: 2024-07-26 | Stop reason: HOSPADM

## 2024-07-24 RX ORDER — SODIUM CHLORIDE 0.9 % (FLUSH) 0.9 %
5-40 SYRINGE (ML) INJECTION EVERY 12 HOURS SCHEDULED
Status: DISCONTINUED | OUTPATIENT
Start: 2024-07-24 | End: 2024-07-25

## 2024-07-24 RX ORDER — OXYCODONE HYDROCHLORIDE 5 MG/1
5 TABLET ORAL EVERY 4 HOURS PRN
Status: DISCONTINUED | OUTPATIENT
Start: 2024-07-24 | End: 2024-07-24 | Stop reason: HOSPADM

## 2024-07-24 RX ORDER — METHYLERGONOVINE MALEATE 0.2 MG/ML
200 INJECTION INTRAVENOUS PRN
Status: DISCONTINUED | OUTPATIENT
Start: 2024-07-24 | End: 2024-07-24 | Stop reason: HOSPADM

## 2024-07-24 RX ORDER — SEVOFLURANE 250 ML/250ML
1 LIQUID RESPIRATORY (INHALATION) CONTINUOUS PRN
Status: DISCONTINUED | OUTPATIENT
Start: 2024-07-24 | End: 2024-07-24 | Stop reason: HOSPADM

## 2024-07-24 RX ORDER — SODIUM CHLORIDE 0.9 % (FLUSH) 0.9 %
5-40 SYRINGE (ML) INJECTION PRN
Status: DISCONTINUED | OUTPATIENT
Start: 2024-07-24 | End: 2024-07-24 | Stop reason: HOSPADM

## 2024-07-24 RX ORDER — MORPHINE SULFATE 2 MG/ML
2 INJECTION, SOLUTION INTRAMUSCULAR; INTRAVENOUS
Status: DISCONTINUED | OUTPATIENT
Start: 2024-07-24 | End: 2024-07-24 | Stop reason: HOSPADM

## 2024-07-24 RX ORDER — GLUCAGON 1 MG/ML
1 KIT INJECTION PRN
Status: DISCONTINUED | OUTPATIENT
Start: 2024-07-24 | End: 2024-07-26 | Stop reason: HOSPADM

## 2024-07-24 RX ORDER — FENTANYL CITRATE 50 UG/ML
50 INJECTION, SOLUTION INTRAMUSCULAR; INTRAVENOUS
Status: DISCONTINUED | OUTPATIENT
Start: 2024-07-24 | End: 2024-07-24 | Stop reason: HOSPADM

## 2024-07-24 RX ORDER — OXYCODONE HYDROCHLORIDE 5 MG/1
10 TABLET ORAL EVERY 4 HOURS PRN
Status: DISCONTINUED | OUTPATIENT
Start: 2024-07-24 | End: 2024-07-24 | Stop reason: HOSPADM

## 2024-07-24 RX ORDER — DIPHENHYDRAMINE HYDROCHLORIDE 50 MG/ML
25 INJECTION INTRAMUSCULAR; INTRAVENOUS EVERY 4 HOURS PRN
Status: DISCONTINUED | OUTPATIENT
Start: 2024-07-24 | End: 2024-07-24 | Stop reason: HOSPADM

## 2024-07-24 RX ORDER — SODIUM CHLORIDE, SODIUM LACTATE, POTASSIUM CHLORIDE, AND CALCIUM CHLORIDE .6; .31; .03; .02 G/100ML; G/100ML; G/100ML; G/100ML
500 INJECTION, SOLUTION INTRAVENOUS PRN
Status: DISCONTINUED | OUTPATIENT
Start: 2024-07-24 | End: 2024-07-24 | Stop reason: HOSPADM

## 2024-07-24 RX ORDER — LIDOCAINE HYDROCHLORIDE 10 MG/ML
30 INJECTION, SOLUTION INFILTRATION; PERINEURAL PRN
Status: COMPLETED | OUTPATIENT
Start: 2024-07-24 | End: 2024-07-24

## 2024-07-24 RX ORDER — DEXTROSE MONOHYDRATE 100 MG/ML
INJECTION, SOLUTION INTRAVENOUS CONTINUOUS PRN
Status: DISCONTINUED | OUTPATIENT
Start: 2024-07-24 | End: 2024-07-26 | Stop reason: HOSPADM

## 2024-07-24 RX ORDER — MISOPROSTOL 200 UG/1
400 TABLET ORAL PRN
Status: DISCONTINUED | OUTPATIENT
Start: 2024-07-24 | End: 2024-07-24

## 2024-07-24 RX ORDER — DEXTROSE MONOHYDRATE 100 MG/ML
INJECTION, SOLUTION INTRAVENOUS CONTINUOUS PRN
Status: DISCONTINUED | OUTPATIENT
Start: 2024-07-24 | End: 2024-07-24

## 2024-07-24 RX ORDER — ACETAMINOPHEN 325 MG/1
650 TABLET ORAL EVERY 4 HOURS PRN
Status: DISCONTINUED | OUTPATIENT
Start: 2024-07-24 | End: 2024-07-24 | Stop reason: HOSPADM

## 2024-07-24 RX ORDER — ONDANSETRON 2 MG/ML
4 INJECTION INTRAMUSCULAR; INTRAVENOUS EVERY 6 HOURS PRN
Status: DISCONTINUED | OUTPATIENT
Start: 2024-07-24 | End: 2024-07-24

## 2024-07-24 RX ORDER — IBUPROFEN 800 MG/1
800 TABLET ORAL
Status: COMPLETED | OUTPATIENT
Start: 2024-07-24 | End: 2024-07-24

## 2024-07-24 RX ORDER — CARBOPROST TROMETHAMINE 250 UG/ML
250 INJECTION, SOLUTION INTRAMUSCULAR PRN
Status: DISCONTINUED | OUTPATIENT
Start: 2024-07-24 | End: 2024-07-24 | Stop reason: HOSPADM

## 2024-07-24 RX ORDER — SODIUM CHLORIDE 0.9 % (FLUSH) 0.9 %
5-40 SYRINGE (ML) INJECTION PRN
Status: DISCONTINUED | OUTPATIENT
Start: 2024-07-24 | End: 2024-07-25

## 2024-07-24 RX ORDER — MORPHINE SULFATE 4 MG/ML
4 INJECTION, SOLUTION INTRAMUSCULAR; INTRAVENOUS
Status: DISCONTINUED | OUTPATIENT
Start: 2024-07-24 | End: 2024-07-24 | Stop reason: HOSPADM

## 2024-07-24 RX ORDER — ONDANSETRON 4 MG/1
4 TABLET, ORALLY DISINTEGRATING ORAL EVERY 6 HOURS PRN
Status: DISCONTINUED | OUTPATIENT
Start: 2024-07-24 | End: 2024-07-26 | Stop reason: HOSPADM

## 2024-07-24 RX ORDER — SODIUM CHLORIDE 9 MG/ML
INJECTION, SOLUTION INTRAVENOUS PRN
Status: DISCONTINUED | OUTPATIENT
Start: 2024-07-24 | End: 2024-07-25

## 2024-07-24 RX ORDER — SODIUM CHLORIDE, SODIUM LACTATE, POTASSIUM CHLORIDE, AND CALCIUM CHLORIDE .6; .31; .03; .02 G/100ML; G/100ML; G/100ML; G/100ML
1000 INJECTION, SOLUTION INTRAVENOUS PRN
Status: DISCONTINUED | OUTPATIENT
Start: 2024-07-24 | End: 2024-07-24 | Stop reason: HOSPADM

## 2024-07-24 RX ORDER — IBUPROFEN 800 MG/1
800 TABLET ORAL EVERY 8 HOURS SCHEDULED
Status: DISCONTINUED | OUTPATIENT
Start: 2024-07-25 | End: 2024-07-26 | Stop reason: HOSPADM

## 2024-07-24 RX ADMIN — Medication: at 21:29

## 2024-07-24 RX ADMIN — SODIUM CHLORIDE, POTASSIUM CHLORIDE, SODIUM LACTATE AND CALCIUM CHLORIDE: 600; 310; 30; 20 INJECTION, SOLUTION INTRAVENOUS at 08:47

## 2024-07-24 RX ADMIN — SODIUM CHLORIDE, POTASSIUM CHLORIDE, SODIUM LACTATE AND CALCIUM CHLORIDE: 600; 310; 30; 20 INJECTION, SOLUTION INTRAVENOUS at 18:09

## 2024-07-24 RX ADMIN — SODIUM CHLORIDE, POTASSIUM CHLORIDE, SODIUM LACTATE AND CALCIUM CHLORIDE: 600; 310; 30; 20 INJECTION, SOLUTION INTRAVENOUS at 05:45

## 2024-07-24 RX ADMIN — Medication 16 ML/HR: at 17:24

## 2024-07-24 RX ADMIN — Medication 1 MILLI-UNITS/MIN: at 06:38

## 2024-07-24 RX ADMIN — Medication 166.7 ML: at 19:23

## 2024-07-24 RX ADMIN — LIDOCAINE HYDROCHLORIDE 30 ML: 10 INJECTION, SOLUTION INFILTRATION; PERINEURAL at 19:27

## 2024-07-24 RX ADMIN — ONDANSETRON 4 MG: 2 INJECTION INTRAMUSCULAR; INTRAVENOUS at 17:11

## 2024-07-24 RX ADMIN — SODIUM CHLORIDE, POTASSIUM CHLORIDE, SODIUM LACTATE AND CALCIUM CHLORIDE: 600; 310; 30; 20 INJECTION, SOLUTION INTRAVENOUS at 16:54

## 2024-07-24 RX ADMIN — Medication 87.3 MILLI-UNITS/MIN: at 19:34

## 2024-07-24 RX ADMIN — IBUPROFEN 800 MG: 800 TABLET, FILM COATED ORAL at 20:18

## 2024-07-24 ASSESSMENT — PAIN SCALES - GENERAL
PAINLEVEL_OUTOF10: 4
PAINLEVEL_OUTOF10: 1
PAINLEVEL_OUTOF10: 4

## 2024-07-24 ASSESSMENT — PAIN DESCRIPTION - ORIENTATION
ORIENTATION: ANTERIOR
ORIENTATION: LOWER

## 2024-07-24 ASSESSMENT — PAIN DESCRIPTION - ONSET: ONSET: ON-GOING

## 2024-07-24 ASSESSMENT — PAIN DESCRIPTION - FREQUENCY: FREQUENCY: INTERMITTENT

## 2024-07-24 ASSESSMENT — PAIN DESCRIPTION - PAIN TYPE: TYPE: ACUTE PAIN

## 2024-07-24 ASSESSMENT — PAIN DESCRIPTION - DESCRIPTORS
DESCRIPTORS: ACHING;CRAMPING
DESCRIPTORS: DISCOMFORT

## 2024-07-24 ASSESSMENT — PAIN DESCRIPTION - LOCATION: LOCATION: ABDOMEN

## 2024-07-24 NOTE — FLOWSHEET NOTE
Finger stick blood sugar 83 per hospital machine. Pts dexcom 97.  Pt continues to have her insulin pump.

## 2024-07-24 NOTE — PROGRESS NOTES
Called for delivery. On arrival pt complete and +3. Instructed on pushing techniques. FHTs: 120, mod raoul, +Accels, variable decels with pushing. [place]: q2-3 min. Category II tracing. Overall reassuring. Anticipate .     Bettina Thakur MD  7:40 PM  2024

## 2024-07-24 NOTE — FLOWSHEET NOTE
38w1d patient of Dr. Mane presents to L&D for scheduled IOL d/t DM1.     Pt notes positive fetal movement. Denies vaginal bleeding or leaking of fluids. Denies regular contractions.    Gown given to change into. Patient to bathroom to void, informed of maternal drug testing policy in place on all laboring patients. Verbal consent received, paper consent to be signed and urine to be sent.    Explained patients right to have family, representative or physician notified of their admission.  Patient has Declined for physician to be notified.  Patient has Declined for family/representative to be notified.

## 2024-07-24 NOTE — FLOWSHEET NOTE
Dr Mane returned page. Updated pt arrived and ready for scheduled induction due to type 1 diabetes.  FHT's reactive. Contractions irregular.  SVE 1/thick. Pitocin at 1 milliunit/minute.  Reviewed with MD regarding pt's continuous glucometer and insulin pump. Blood sugar this morning was 135 at 0555 after eating breakfast at 5205-4207. Orders received.

## 2024-07-24 NOTE — ANESTHESIA PRE PROCEDURE
Department of Anesthesiology  Preprocedure Note       Name:  Paloma Schumacher   Age:  29 y.o.  :  1995                                          MRN:  [id number]         Date:  2024      Surgeon: * No surgeons listed *    Procedure: * No procedures listed *    Medications prior to admission:   Prior to Admission medications    Medication Sig Start Date End Date Taking? Authorizing Provider   Insulin Pump - insulin lispro Inject 2 Units/hr into the skin continuous Insulin-to-Carb Ratio (ICR): 5-1  Insulin Sensitivity Factor (ISF): 1-30 mg/dL per unit of insulin  Target Blood Glucose: 110 mg/dL  Bolus Frequency: with meals    ProviderFreddy MD   Prenatal MV-Min-Fe Fum-FA-DHA (PRENATAL 1 PO) Take by mouth    ProviderFreddy MD   aspirin 81 MG chewable tablet Take 1 tablet by mouth daily    ProviderFreddy MD       Current medications:    Current Facility-Administered Medications   Medication Dose Route Frequency Provider Last Rate Last Admin    oxytocin (PITOCIN) 30 units in 500 mL infusion  1-20 caryn-units/min IntraVENous Continuous Sully Mane MD 6 mL/hr at 24 1614 6 caryn-units/min at 24 1614    terbutaline (BRETHINE) injection 0.25 mg  0.25 mg SubCUTAneous Once PRN Sully Mane MD        lactated ringers IV soln infusion   IntraVENous Continuous Sully Mane  mL/hr at 24 1703 Rate Change at 24 1703    lactated ringers bolus 500 mL  500 mL IntraVENous PRN Sully Mane MD        Or    lactated ringers bolus 1,000 mL  1,000 mL IntraVENous PRN Sully Mane MD        sodium chloride flush 0.9 % injection 5-40 mL  5-40 mL IntraVENous 2 times per day Sully Mane MD        sodium chloride flush 0.9 % injection 5-40 mL  5-40 mL IntraVENous PRN Sully Mane MD        0.9 % sodium chloride infusion   IntraVENous PRN Sully Mane MD        lidocaine 1 % injection 30 mL  30 mL Other PRN Sully Mane MD        fentaNYL (SUBLIMAZE)

## 2024-07-24 NOTE — FLOWSHEET NOTE
Dr Mane called unit update given; pitocin continues at 8 MU. Pt knows contractions occur but are not painful at this time. VE 2/60/-3. MD will be over to evaluate for ROM.

## 2024-07-24 NOTE — L&D DELIVERY NOTE
Department of Obstetrics and Gynecology   Vaginal Delivery Note at [facility]      Date of Delivery:  2024    Procedure:  Spontaneous vaginal delivery and Repair second degree spontaneous laceration    Surgeon:  Bettina Thakur MD    Anesthesia:  epidural anesthesia    Estimated blood loss:  400ml    Cord blood sent Yes    Complications:  none    Past Medical History:   Diagnosis Date    Anxiety     Asthma     exercise induced    Diabetes mellitus (HCC)     Type 1    Headache     Rh incompatibility     Thyroid disease     hashimotos    UTI (urinary tract infection)          Condition:  infant stable to general nursery and mother stable    Details of Procedure:   The patient is a 29 y.o.  female at 38w1d  who was admitted for IOL for type I DM. She received the following interventions: ARBOW and IV Pitocin induction. The patient progressed well,did receive an epidural, became complete and started to push. Patient progressed well and the fetal head was delivered over an intact perineum, and the rest of the infant delivered atraumatically, nuchal cord reduced and infant placed on mother abdomen. The cord was clamped and cut after 1 minute and the crying infant placed skin to skin with the waiting nurse at bedside for evaluation. Cord blood and cord segment were collected. The delivery of the placenta was spontaneous. The perineum and vagina were explored and a second degree laceration was repaired in standard fashion with 3-0 vicryl.  A vaginal sweep was completed and  sharps were placed in the proper container.  Sponge count correct.     Infant Wt: pending    APGARS:     Merna Schumacher [[id number]]      Apgars    Living status: Living  Apgars   1 Minute:  5 Minute:  10 Minute 15 Minute 20 Minute   Skin Color: 0  1       Heart Rate: 2  2       Reflex Irritability: 2  2       Muscle Tone: 2  2       Respiratory Effort: 2  2       Total: 8  9               Apgars Assigned By: MARIA DEL ROSARIO KEARNS RN

## 2024-07-24 NOTE — FLOWSHEET NOTE
Dr Mane called unit update given; pt currently waiting for epidural to be placed. Last VE 3-4/70/-2, IUPC and FSE placed. Pitocin was up to 12 MU cut to 6MU. Last Chemstick via pt meter at 126 hospital meter 103. MD informed pt did have an apple juice since her blood sugar dropped to 64 at 1500. No new orders received.

## 2024-07-24 NOTE — H&P
[facility]  History and Physical Update    Pt Name: Paloma Schumacher  MRN: [id number]  YOB: 1995  Date of evaluation: 2024    [x] I have examined the patient and reviewed the H&P/Consult and there are no changes to the patient or plans.  28yo  at 38/1 weeks presented for IOL due to Type 1 DM on insulin pump. Plan for hourly BS recording. Continue pump. FHT category 1. Plan for pitocin and AROM .    [] I have examined the patient and reviewed the H&P/Consult and have noted the following changes:            Sully Mane MD,MD  Electronically signed 2024 at 8:15 AM

## 2024-07-24 NOTE — FLOWSHEET NOTE
Blood sugar via hospital machine at 64. Pts dexcom at 83. Pt given apple juice per request. Basil at 0.1

## 2024-07-24 NOTE — PLAN OF CARE
Problem: Pain  Goal: Verbalizes/displays adequate comfort level or baseline comfort level  Outcome: Progressing     Problem: Vaginal Birth or  Section  Goal: Fetal and maternal status remain reassuring during the birth process  Description:  Birth OB-Pregnancy care plan goal which identifies if the fetal and maternal status remain reassuring during the birth process  Outcome: Progressing  Flowsheets (Taken 2024)  Fetal and Maternal Status Remain Reassuring During the Birth Process:   Monitor vital signs   Monitor uterine activity   Monitor fetal heart rate   Monitor labor progression (Vaginal delivery)     Problem: Infection - Adult  Goal: Absence of infection during hospitalization  Outcome: Progressing  Flowsheets (Taken 2024)  Absence of infection during hospitalization:   Assess and monitor for signs and symptoms of infection   Instruct and encourage patient and family to use good hand hygiene technique     Problem: Safety - Adult  Goal: Free from fall injury  Outcome: Progressing  Flowsheets (Taken 2024)  Free From Fall Injury:   Instruct family/caregiver on patient safety   Based on caregiver fall risk screen, instruct family/caregiver to ask for assistance with transferring infant if caregiver noted to have fall risk factors     Problem: Discharge Planning  Goal: Discharge to home or other facility with appropriate resources  Outcome: Progressing  Flowsheets (Taken 2024)  Discharge to home or other facility with appropriate resources: Refer to discharge planning if patient needs post-hospital services based on physician order or complex needs related to functional status, cognitive ability or social support system     Problem: Chronic Conditions and Co-morbidities  Goal: Patient's chronic conditions and co-morbidity symptoms are monitored and maintained or improved  Outcome: Progressing  Flowsheets (Taken 2024)  Care Plan - Patient's Chronic

## 2024-07-24 NOTE — ANESTHESIA PROCEDURE NOTES
Epidural Block    Patient location during procedure: OB  Start time: 7/24/2024 5:15 PM  End time: 7/24/2024 5:23 PM  Reason for block: labor epidural  Staffing  Performed: resident/CRNA   Anesthesiologist: Lito Campoverde MD  Resident/CRNA: Vic Randhawa APRN - CRNA  Performed by: Vic Randhawa APRN - CRNA  Authorized by: Lito Campoverde MD    Epidural  Patient position: sitting  Prep: ChloraPrep  Patient monitoring: continuous pulse ox and frequent blood pressure checks  Approach: midline  Location: L3-4  Injection technique: ALETA saline  Provider prep: mask and sterile gloves  Needle  Needle type: Tuohy   Needle gauge: 18 G  Needle length: 3.5 in  Needle insertion depth: 7 cm  Catheter type: side hole  Catheter size: 19 G  Catheter at skin depth: 13 cm  Test dose: negativeCatheter Secured: tegaderm and tape  Assessment  Hemodynamics: stable  Attempts: 1  Outcomes: uncomplicated  Preanesthetic Checklist  Completed: patient identified, IV checked, site marked, risks and benefits discussed, surgical/procedural consents, equipment checked, pre-op evaluation, timeout performed, anesthesia consent given, oxygen available, monitors applied/VS acknowledged, fire risk safety assessment completed and verbalized and blood product R/B/A discussed and consented

## 2024-07-24 NOTE — FLOWSHEET NOTE
Dr Thakur called unit update given; VE 5/90/-1 pt just stated she is starting to feel pressure. No change in POC

## 2024-07-25 LAB
FETAL CELL SCN BLD QL ROSETTE: NORMAL
HCT VFR BLD AUTO: 29.2 % (ref 37–47)
HGB BLD-MCNC: 8.9 GM/DL (ref 12–16)

## 2024-07-25 PROCEDURE — 85018 HEMOGLOBIN: CPT

## 2024-07-25 PROCEDURE — 36415 COLL VENOUS BLD VENIPUNCTURE: CPT

## 2024-07-25 PROCEDURE — 85014 HEMATOCRIT: CPT

## 2024-07-25 PROCEDURE — 6370000000 HC RX 637 (ALT 250 FOR IP): Performed by: OBSTETRICS & GYNECOLOGY

## 2024-07-25 PROCEDURE — 85461 HEMOGLOBIN FETAL: CPT

## 2024-07-25 PROCEDURE — 1200000000 HC SEMI PRIVATE

## 2024-07-25 RX ORDER — PSEUDOEPHEDRINE HCL 30 MG
100 TABLET ORAL 2 TIMES DAILY PRN
COMMUNITY
Start: 2024-07-25

## 2024-07-25 RX ORDER — IBUPROFEN 200 MG
800 TABLET ORAL EVERY 8 HOURS PRN
COMMUNITY
Start: 2024-07-25

## 2024-07-25 RX ADMIN — IBUPROFEN 800 MG: 800 TABLET, FILM COATED ORAL at 13:52

## 2024-07-25 RX ADMIN — DOCUSATE SODIUM 100 MG: 100 CAPSULE, LIQUID FILLED ORAL at 09:07

## 2024-07-25 RX ADMIN — IBUPROFEN 800 MG: 800 TABLET, FILM COATED ORAL at 21:39

## 2024-07-25 RX ADMIN — IBUPROFEN 800 MG: 800 TABLET, FILM COATED ORAL at 05:18

## 2024-07-25 RX ADMIN — ACETAMINOPHEN 1000 MG: 500 TABLET ORAL at 15:14

## 2024-07-25 RX ADMIN — DOCUSATE SODIUM 100 MG: 100 CAPSULE, LIQUID FILLED ORAL at 21:40

## 2024-07-25 ASSESSMENT — PAIN - FUNCTIONAL ASSESSMENT: PAIN_FUNCTIONAL_ASSESSMENT: ACTIVITIES ARE NOT PREVENTED

## 2024-07-25 ASSESSMENT — PAIN SCALES - GENERAL
PAINLEVEL_OUTOF10: 0
PAINLEVEL_OUTOF10: 3
PAINLEVEL_OUTOF10: 2
PAINLEVEL_OUTOF10: 0
PAINLEVEL_OUTOF10: 2
PAINLEVEL_OUTOF10: 0
PAINLEVEL_OUTOF10: 0

## 2024-07-25 ASSESSMENT — PAIN DESCRIPTION - FREQUENCY: FREQUENCY: CONTINUOUS

## 2024-07-25 ASSESSMENT — PAIN DESCRIPTION - DESCRIPTORS: DESCRIPTORS: DISCOMFORT

## 2024-07-25 ASSESSMENT — PAIN DESCRIPTION - LOCATION: LOCATION: PERINEUM

## 2024-07-25 ASSESSMENT — PAIN DESCRIPTION - PAIN TYPE: TYPE: ACUTE PAIN

## 2024-07-25 ASSESSMENT — PAIN DESCRIPTION - ORIENTATION: ORIENTATION: MID

## 2024-07-25 NOTE — ANESTHESIA POSTPROCEDURE EVALUATION
Department of Anesthesiology  Postprocedure Note    Patient: Paloma Schumacher  MRN: [id number]  YOB: 1995  Date of evaluation: 7/25/2024    Procedure Summary       Date: 07/24/24 Room / Location:     Anesthesia Start: 1715 Anesthesia Stop: 1918    Procedure: Labor Analgesia Diagnosis:     Scheduled Providers:  Responsible Provider: Lito Campoverde MD    Anesthesia Type: epidural ASA Status: 2            Anesthesia Type: No value filed.    Katie Phase I: Katie Score: 9    Katie Phase II: Katie Score: 10    Anesthesia Post Evaluation    Patient location during evaluation: floor  Patient participation: complete - patient participated  Level of consciousness: awake and alert  Pain score: 0  Airway patency: patent  Nausea & Vomiting: no vomiting and no nausea  Cardiovascular status: blood pressure returned to baseline and hemodynamically stable  Respiratory status: acceptable, nonlabored ventilation, spontaneous ventilation and room air  Hydration status: stable        No notable events documented.

## 2024-07-25 NOTE — DISCHARGE SUMMARY
Vaginal Delivery Discharge Summary    Gestational Age:38w1d    Antepartum complications: none    Date of Delivery: 2024     Condition: Good     Type of Delivery: Vaginal     [place] Data  Information for the patient's :  Merna Schumacher [[id number]]   female   Birth Weight: 3.65 kg (8 lb 0.8 oz)     Labs: CBC   Lab Results   Component Value Date    HGB 8.9 (L) 2024    HCT 29.2 (L) 2024        Intrapartum complications: None    Postpartum complications: none    The patient is ambulating well. The patient is tolerating a normal diet.      Discharge Date: 2024    Plan:   Follow up in the office in 6 weeks    Sully Mane MD

## 2024-07-25 NOTE — PLAN OF CARE
Problem: Pain  Goal: Verbalizes/displays adequate comfort level or baseline comfort level  Recent Flowsheet Documentation  Taken 7/25/2024 1105 by Genie Gruber RN  Verbalizes/displays adequate comfort level or baseline comfort level: Encourage patient to monitor pain and request assistance  Taken 7/24/2024 2245 by Katie Short RN  Verbalizes/displays adequate comfort level or baseline comfort level: Encourage patient to monitor pain and request assistance     Problem: Infection - Adult  Goal: Absence of infection during hospitalization  Recent Flowsheet Documentation  Taken 7/24/2024 2245 by Katie Short RN  Absence of infection during hospitalization: Assess and monitor for signs and symptoms of infection     Problem: Safety - Adult  Goal: Free from fall injury  Recent Flowsheet Documentation  Taken 7/25/2024 1105 by Genie Gruber RN  Free From Fall Injury: Instruct family/caregiver on patient safety  Taken 7/24/2024 2245 by Katie Short RN  Free From Fall Injury: Instruct family/caregiver on patient safety     Problem: Discharge Planning  Goal: Discharge to home or other facility with appropriate resources  Recent Flowsheet Documentation  Taken 7/25/2024 1105 by Genie Gruber RN  Discharge to home or other facility with appropriate resources: Identify barriers to discharge with patient and caregiver  Taken 7/24/2024 2245 by Katie Short RN  Discharge to home or other facility with appropriate resources: Identify barriers to discharge with patient and caregiver     Problem: Chronic Conditions and Co-morbidities  Goal: Patient's chronic conditions and co-morbidity symptoms are monitored and maintained or improved  Recent Flowsheet Documentation  Taken 7/25/2024 1105 by Genie Gruber RN  Care Plan - Patient's Chronic Conditions and Co-Morbidity Symptoms are Monitored and Maintained or Improved: Monitor and assess patient's chronic conditions and comorbid symptoms for stability, deterioration,

## 2024-07-25 NOTE — PLAN OF CARE
Problem: Postpartum  Goal: Experiences normal postpartum course  Description:  Postpartum OB-Pregnancy care plan goal which identifies if the mother is experiencing a normal postpartum course  Outcome: Progressing  Flowsheets (Taken 7/24/2024 2303)  Experiences Normal Postpartum Course: Monitor maternal vital signs     Problem: Pain  Goal: Verbalizes/displays adequate comfort level or baseline comfort level  Outcome: Progressing  Flowsheets (Taken 7/24/2024 2245)  Verbalizes/displays adequate comfort level or baseline comfort level: Encourage patient to monitor pain and request assistance     Problem: Infection - Adult  Goal: Absence of infection at discharge  Outcome: Progressing  Flowsheets (Taken 7/24/2024 2303)  Absence of infection at discharge: Assess and monitor for signs and symptoms of infection     Problem: Safety - Adult  Goal: Free from fall injury  Outcome: Progressing  Flowsheets (Taken 7/24/2024 2245)  Free From Fall Injury: Instruct family/caregiver on patient safety     Problem: Discharge Planning  Goal: Discharge to home or other facility with appropriate resources  Outcome: Progressing  Flowsheets (Taken 7/24/2024 2245)  Discharge to home or other facility with appropriate resources: Identify barriers to discharge with patient and caregiver     Problem: Chronic Conditions and Co-morbidities  Goal: Patient's chronic conditions and co-morbidity symptoms are monitored and maintained or improved  Outcome: Progressing  Flowsheets (Taken 7/24/2024 0792 by Katie Armenta, RN)  Care Plan - Patient's Chronic Conditions and Co-Morbidity Symptoms are Monitored and Maintained or Improved: Monitor and assess patient's chronic conditions and comorbid symptoms for stability, deterioration, or improvement       Care plan reviewed with patient and she contributes to goal setting and voices understanding of plan of care.

## 2024-07-25 NOTE — PROGRESS NOTES
Department of Obstetrics and Gynecology  Labor and Delivery  Attending Post Partum Progress Note    SUBJECTIVE:  PPD# 1    OBJECTIVE: Doing well     Vitals:  BP (!) 119/57   Pulse 82   Temp 98 °F (36.7 °C) (Oral)   Resp 16   Ht 1.676 m (5' 6\")   Wt 134.3 kg (296 lb)   SpO2 98%   Breastfeeding Unknown   BMI 47.78 kg/m²     ABDOMEN:  Soft, NT, ND, fundus firm      DATA:    Hemoglobin:   Lab Results   Component Value Date/Time    HGB 8.9 07/25/2024 06:03 AM    HCT 29.2 07/25/2024 06:03 AM       ASSESSMENT & PLAN:    PPD#1  - D/C home tomorrow  - Change insulin pump settings to pre-pregnancy settings.     Sully Mane MD

## 2024-07-25 NOTE — PROGRESS NOTES
Pt admitted to  5AB16 via in a wheelchair from  l and d .  Complaints: None.    IV lactated Ringer's infusing into the forearm left, condition patent and no redness at a rate of 125 mls/ hour.    IV site free of s/s of infection or infiltration. Vital signs obtained. Assessment and data collection initiated.   Oriented to room. Policies and procedures for 5AB explained. All questions answered with no further questions at this time. Fall prevention and safety brochure discussed with patient.   Call light in reach.Oriented to room.       Explained patients right to have family, representative or physician notified of their admission.  Patient has Declined for physician to be notified.  Patient has Declined for family/representative to be notified.

## 2024-07-25 NOTE — FLOWSHEET NOTE
Patient transferred to mom/baby unit via wheelchair with RN assist while holding infant. FOB pushed cart filled with their belongings and paperwork behind wheelchair. Both mom and infant tolerated transfer well and both stable upon arrival.

## 2024-07-25 NOTE — FLOWSHEET NOTE
Pt ambulated to the bathroom and voided sufficient amount of urine. Joselin care done. Pt would like to keep her own gown on. New pad applied. Pt tolerated well.

## 2024-07-26 VITALS
TEMPERATURE: 97.6 F | HEART RATE: 88 BPM | RESPIRATION RATE: 18 BRPM | BODY MASS INDEX: 47.09 KG/M2 | WEIGHT: 293 LBS | SYSTOLIC BLOOD PRESSURE: 130 MMHG | DIASTOLIC BLOOD PRESSURE: 80 MMHG | HEIGHT: 66 IN | OXYGEN SATURATION: 99 %

## 2024-07-26 LAB
ABO: NORMAL
ANTIBODY SCREEN: NORMAL
INDIRECT COOMBS: NORMAL
RH FACTOR: NORMAL

## 2024-07-26 PROCEDURE — 6370000000 HC RX 637 (ALT 250 FOR IP): Performed by: OBSTETRICS & GYNECOLOGY

## 2024-07-26 RX ORDER — FERROUS SULFATE 325(65) MG
325 TABLET ORAL
Status: DISCONTINUED | OUTPATIENT
Start: 2024-07-26 | End: 2024-07-26 | Stop reason: HOSPADM

## 2024-07-26 RX ADMIN — ACETAMINOPHEN 1000 MG: 500 TABLET ORAL at 01:14

## 2024-07-26 ASSESSMENT — PAIN DESCRIPTION - FREQUENCY: FREQUENCY: INTERMITTENT

## 2024-07-26 ASSESSMENT — PAIN DESCRIPTION - DESCRIPTORS: DESCRIPTORS: DISCOMFORT

## 2024-07-26 ASSESSMENT — PAIN DESCRIPTION - LOCATION: LOCATION: BACK

## 2024-07-26 ASSESSMENT — PAIN DESCRIPTION - ONSET: ONSET: ON-GOING

## 2024-07-26 ASSESSMENT — PAIN - FUNCTIONAL ASSESSMENT: PAIN_FUNCTIONAL_ASSESSMENT: ACTIVITIES ARE NOT PREVENTED

## 2024-07-26 ASSESSMENT — PAIN DESCRIPTION - ORIENTATION: ORIENTATION: POSTERIOR

## 2024-07-26 ASSESSMENT — PAIN SCALES - GENERAL
PAINLEVEL_OUTOF10: 0
PAINLEVEL_OUTOF10: 1

## 2024-07-26 ASSESSMENT — PAIN DESCRIPTION - PAIN TYPE: TYPE: ACUTE PAIN

## 2024-07-26 NOTE — PLAN OF CARE
Problem: Postpartum  Goal: Experiences normal postpartum course  Description:  Postpartum OB-Pregnancy care plan goal which identifies if the mother is experiencing a normal postpartum course  7/25/2024 2012 by Katie Short RN  Outcome: Progressing  Flowsheets (Taken 7/25/2024 2000)  Experiences Normal Postpartum Course: Monitor maternal vital signs  7/25/2024 1105 by Genie Gruber, RN  Outcome: Progressing  Flowsheets (Taken 7/25/2024 1105)  Experiences Normal Postpartum Course: Monitor maternal vital signs  Note: Vs wnl     Problem: Pain  Goal: Verbalizes/displays adequate comfort level or baseline comfort level  7/25/2024 2012 by Katie Short, RN  Outcome: Progressing  Flowsheets (Taken 7/25/2024 2000)  Verbalizes/displays adequate comfort level or baseline comfort level: Encourage patient to monitor pain and request assistance  7/25/2024 1105 by Genie Gruber, RN  Outcome: Progressing  Flowsheets (Taken 7/25/2024 1105)  Verbalizes/displays adequate comfort level or baseline comfort level: Encourage patient to monitor pain and request assistance  Note: Goal is documented, pt resting for comfort     Problem: Infection - Adult  Goal: Absence of infection at discharge  7/25/2024 2012 by Katie Short, RN  Outcome: Progressing  Flowsheets (Taken 7/25/2024 2000)  Absence of infection at discharge: Assess and monitor for signs and symptoms of infection  7/25/2024 1105 by Genie Gruber, RN  Outcome: Progressing  Note: No foul smelling drainage noted     Problem: Safety - Adult  Goal: Free from fall injury  7/25/2024 2012 by Katie Short RN  Outcome: Progressing  Flowsheets (Taken 7/25/2024 2000)  Free From Fall Injury: Instruct family/caregiver on patient safety  7/25/2024 1105 by Genie Gruber, RN  Outcome: Progressing  Flowsheets (Taken 7/25/2024 1105)  Free From Fall Injury: Instruct family/caregiver on patient safety  Note: No falls noted     Problem: Discharge Planning  Goal: Discharge to home or

## 2024-07-26 NOTE — PLAN OF CARE
Problem: Postpartum  Goal: Experiences normal postpartum course  Description:  Postpartum OB-Pregnancy care plan goal which identifies if the mother is experiencing a normal postpartum course  7/26/2024 0900 by Genie Eli RN  Outcome: Progressing  Flowsheets (Taken 7/26/2024 0801)  Experiences Normal Postpartum Course: Monitor maternal vital signs     Problem: Pain  Goal: Verbalizes/displays adequate comfort level or baseline comfort level  7/26/2024 0900 by Genie Eli, RN  Outcome: Progressing  Flowsheets (Taken 7/26/2024 0801)  Verbalizes/displays adequate comfort level or baseline comfort level: Encourage patient to monitor pain and request assistance     Problem: Infection - Adult  Goal: Absence of infection at discharge  7/26/2024 0900 by Genie Eli RN  Outcome: Progressing  Flowsheets (Taken 7/26/2024 0801)  Absence of infection at discharge: Assess and monitor for signs and symptoms of infection     Problem: Safety - Adult  Goal: Free from fall injury  7/26/2024 0900 by Genie Eli, RN  Outcome: Progressing  Flowsheets (Taken 7/26/2024 0801)  Free From Fall Injury: Instruct family/caregiver on patient safety     Problem: Discharge Planning  Goal: Discharge to home or other facility with appropriate resources  7/26/2024 0900 by Genie Eli RN  Outcome: Progressing  Flowsheets (Taken 7/26/2024 0801)  Discharge to home or other facility with appropriate resources: Identify barriers to discharge with patient and caregiver     Problem: Chronic Conditions and Co-morbidities  Goal: Patient's chronic conditions and co-morbidity symptoms are monitored and maintained or improved  7/26/2024 0900 by Genie Eli, RN  Outcome: Progressing  Flowsheets (Taken 7/25/2024 1105 by Genie Gruber, RN)  Care Plan - Patient's Chronic Conditions and Co-Morbidity Symptoms are Monitored and Maintained or Improved: Monitor and assess patient's chronic conditions and comorbid symptoms for stability,

## 2024-07-26 NOTE — FLOWSHEET NOTE
Post birth warning signs education paper given and reviewed, teaching complete. [place] postpartum depression screening discussed with patient, instructed to contact her healthcare provider if her score is > 10. Patient voiced understanding.  Mother's blood type is B-.  Baby's blood type is A-.  Mother did not receive Rhogam.

## 2024-07-26 NOTE — PROGRESS NOTES
Department of Obstetrics and Gynecology  Labor and Delivery  Attending Post Partum Progress Note    SUBJECTIVE:  PPD# 2    OBJECTIVE: DOing well     Vitals:  /87   Pulse 72   Temp 98 °F (36.7 °C) (Oral)   Resp 16   Ht 1.676 m (5' 6\")   Wt 134.3 kg (296 lb)   SpO2 98%   Breastfeeding Unknown   BMI 47.78 kg/m²     ABDOMEN:  Soft, NT, ND, fundus firm      DATA:    Hemoglobin:   Lab Results   Component Value Date/Time    HGB 8.9 07/25/2024 06:03 AM    HCT 29.2 07/25/2024 06:03 AM       ASSESSMENT & PLAN:    PPD#2  - D/C home today    Sully Mane MD